# Patient Record
Sex: MALE | Race: WHITE
[De-identification: names, ages, dates, MRNs, and addresses within clinical notes are randomized per-mention and may not be internally consistent; named-entity substitution may affect disease eponyms.]

---

## 2017-05-07 ENCOUNTER — HOSPITAL ENCOUNTER (EMERGENCY)
Dept: HOSPITAL 31 - C.ER | Age: 52
LOS: 1 days | Discharge: HOME | End: 2017-05-08
Payer: SELF-PAY

## 2017-05-07 VITALS — BODY MASS INDEX: 20.9 KG/M2

## 2017-05-07 DIAGNOSIS — F10.10: ICD-10-CM
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
## 2017-05-07 DIAGNOSIS — S00.93XA: Primary | ICD-10-CM

## 2017-05-07 NOTE — C.PDOC
History Of Present Illness


51 year old patient presents to the ED complaining of falling on his face prior 

to arrival. Patient admits to drinking alcohol tonight. He reports he feels 

fine. Patient denies shortness of breath, nausea, vomiting or any other 

complaints. (Symone Balbuena)


History Per: Patient


History/Exam Limitations: intoxication


Onset/Duration Of Symptoms: Mins (prior to arrival)


Current Symptoms Are (Timing): Still Present


Severity: None


Pain Scale Rating Of: 0


Recent travel outside of the [place]: No


Time Seen by Provider: 05/07/17 21:13


Chief Complaint (Nursing): Abnormal Skin Integrity





Past Medical History


Reviewed: Historical Data, Nursing Documentation, Vital Signs





- Medical History


PMH: Seizures (AS PER EMS)


Family History: States: Unknown Family Hx





- Social History


Hx Alcohol Use: Yes


Hx Substance Use: No





- Immunization History


Hx Tetanus Toxoid Vaccination: No


Hx Influenza Vaccination: No


Hx Pneumococcal Vaccination: No





Review Of Systems


Except As Marked, All Systems Reviewed And Found Negative.


Respiratory: Negative for: Shortness of Breath


Gastrointestinal: Negative for: Nausea, Vomiting





Physical Exam





- Physical Exam


Appears: Non-toxic, No Acute Distress, Other (intoxicated)


Skin: Warm, Dry, Other (superficial abrasions to the distal aspect of the nose; 

mild erythema to the forehead)


Head: Atraumatic, Normacephalic


Eye(s): bilateral: Normal Inspection, EOMI


Ear(s): Bilateral: Normal


Nose: Normal


Oral Mucosa: Moist


Throat: Normal


Neck: Normal ROM, Supple


Chest: Symmetrical


Cardiovascular: Rhythm Regular


Respiratory: Normal Breath Sounds, No Rales, No Rhonchi, No Wheezing


Gastrointestinal/Abdominal: Soft, No Tenderness


Back: Normal Inspection, No CVA Tenderness


Extremity: Normal ROM


Neurological/Psych: Oriented x3, Normal Speech, Normal Cognition, Normal 

Cranial Nerves, Normal Motor, Normal Sensation


Gait: Steady





ED Course And Treatment


O2 Sat by Pulse Oximetry: 100 (Room air)


Pulse Ox Interpretation: Normal


Progress Note: Plan:  -Head CT.  -Reassess and disposition.  The wound was 

cleansed and dressed. CT was reviewed. Pending patient sobriety. Dr. Cuevas 

will reassess and disposition the patient.





Disposition





- Disposition


Disposition Time: 22:50





- Disposition


Referrals: 


[facility] [Outside]


Disposition: HOME/ ROUTINE


Condition: STABLE


Additional Instructions: 


Return to the ED for any new or worsening symptoms


Instructions:  Head Injury (ED)





- Clinical Impression


Clinical Impression: 


 ETOH abuse, Head contusion








- PA / NP / Resident Statement


MD/DO has reviewed & agrees with the documentation as recorded.





- Scribe Statement


The provider has reviewed the documentation as recorded by the Scribe





- Scribe Statement


Aziza Tyson





All medical record entries made by the Scribe were at my direction and 

personally dictated by me. I have reviewed the chart and agree that the record 

accurately reflects my personal performance of the history, physical exam, 

medical decision making, and the department course for this patient. I have 

also personally directed, reviewed, and agree with the discharge instructions 

and disposition. (Symone Balbuena)





Addendum


Addendum: 





Hx and Pe reviewed


Pt remained stable CT neg


On discharge pt is verbal with normal speech, steady gait, and no focal deficit 

(Jason Cuevas)

## 2017-05-08 VITALS
SYSTOLIC BLOOD PRESSURE: 131 MMHG | RESPIRATION RATE: 18 BRPM | TEMPERATURE: 98.3 F | DIASTOLIC BLOOD PRESSURE: 76 MMHG | OXYGEN SATURATION: 99 % | HEART RATE: 81 BPM

## 2017-05-08 NOTE — CT
PROCEDURE:  CT HEAD WITHOUT CONTRAST.



HISTORY:

Head trauma. 



COMPARISON:

None available. 



TECHNIQUE:

Axial computed tomography images were obtained through the head/brain 

without intravenous contrast.  



Radiation dose:



Total exam DLP = 958 mGy-cm.



This CT exam was performed using one or more of the following dose 

reduction techniques: Automated exposure control, adjustment of the 

mA and/or kV according to patient size, and/or use of iterative 

reconstruction technique.



FINDINGS:



HEMORRHAGE:

No intracranial hemorrhage. 



BRAIN:

Mild atrophy.  Scattered focal lucencies in the subcortical and 

periventricular white matter suggestive for chronic microvascular 

ischemic change. 



VENTRICLES:

Unremarkable. No hydrocephalus. 



CALVARIUM:

Question chronic deformity of the right zygomatic arch.



PARANASAL SINUSES:

Scattered mucosal thickening of the maxillary sinuses.



MASTOID AIR CELLS:

Unremarkable as visualized. No inflammatory changes.



OTHER FINDINGS:

Dental caries.  Intracranial vascular calcifications. 



IMPRESSION:

No acute intracranial hemorrhage.



Mild atrophy.



Mild mucosal thickening of the maxillary sinuses.



These findings were preliminarily reported at 10:11 p.m. on 

05/07/2017 by Dr. Rob Wilson from [facility].

## 2017-09-19 ENCOUNTER — HOSPITAL ENCOUNTER (OUTPATIENT)
Dept: HOSPITAL 14 - H.ER | Age: 52
Setting detail: OBSERVATION
Discharge: ANOTHER HOSPITAL | End: 2017-09-19
Attending: EMERGENCY MEDICINE | Admitting: EMERGENCY MEDICINE
Payer: MEDICAID

## 2017-09-19 VITALS — HEART RATE: 101 BPM | OXYGEN SATURATION: 93 %

## 2017-09-19 VITALS — TEMPERATURE: 98.5 F

## 2017-09-19 VITALS — DIASTOLIC BLOOD PRESSURE: 75 MMHG | SYSTOLIC BLOOD PRESSURE: 107 MMHG | RESPIRATION RATE: 19 BRPM

## 2017-09-19 DIAGNOSIS — S02.40DA: ICD-10-CM

## 2017-09-19 DIAGNOSIS — S02.2XXA: Primary | ICD-10-CM
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
## 2017-09-19 DIAGNOSIS — I48.91: ICD-10-CM
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
## 2017-09-19 DIAGNOSIS — F10.239: ICD-10-CM

## 2017-09-19 DIAGNOSIS — R56.9: ICD-10-CM
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
## 2017-09-19 LAB
ALBUMIN/GLOB SERPL: 1 {RATIO} (ref 1–2.1)
ALP SERPL-CCNC: 98 U/L (ref 38–126)
ALT SERPL-CCNC: 15 U/L (ref 21–72)
APTT BLD: 26.3 SECONDS (ref 25.6–37.1)
AST SERPL-CCNC: 33 U/L (ref 17–59)
BASOPHILS # BLD AUTO: 0.2 K/UL (ref 0–0.2)
BASOPHILS NFR BLD: 1 % (ref 0–2)
BASOPHILS NFR BLD: 2.3 % (ref 0–2)
BILIRUB SERPL-MCNC: 0.6 MG/DL (ref 0.2–1.3)
BILIRUB UR-MCNC: NEGATIVE MG/DL
BUN SERPL-MCNC: 9 MG/DL (ref 9–20)
CALCIUM SERPL-MCNC: 9.4 MG/DL (ref 8.4–10.2)
CHLORIDE SERPL-SCNC: 102 MMOL/L (ref 98–107)
CO2 SERPL-SCNC: 19 MMOL/L (ref 22–30)
COLOR UR: (no result)
EOSINOPHIL # BLD AUTO: 0 K/UL (ref 0–0.7)
EOSINOPHIL NFR BLD: 0.4 % (ref 0–4)
EOSINOPHIL NFR BLD: 2 % (ref 0–7)
ERYTHROCYTE [DISTWIDTH] IN BLOOD BY AUTOMATED COUNT: 18.1 % (ref 11.5–14.5)
ETHANOL SERPL-MCNC: < 10 MG/DL (ref 0–10)
GLOBULIN SER-MCNC: 4.1 GM/DL (ref 2.2–3.9)
GLUCOSE SERPL-MCNC: 130 MG/DL (ref 75–110)
GLUCOSE UR STRIP-MCNC: (no result) MG/DL
HCT VFR BLD CALC: 40 % (ref 35–51)
KETONES UR STRIP-MCNC: 20 MG/DL
LEUKOCYTE ESTERASE UR-ACNC: (no result) LEU/UL
LYMPHOCYTES # BLD AUTO: 0.9 K/UL (ref 1–4.3)
LYMPHOCYTES NFR BLD AUTO: 9.2 % (ref 20–40)
MCH RBC QN AUTO: 27.9 PG (ref 27–31)
MCHC RBC AUTO-ENTMCNC: 32.8 G/DL (ref 33–37)
MCV RBC AUTO: 85.2 FL (ref 80–94)
MONOCYTES # BLD: 0.6 K/UL (ref 0–0.8)
MONOCYTES NFR BLD: 6.1 % (ref 0–10)
NEUTROPHILS # BLD: 8.2 K/UL (ref 1.8–7)
NEUTROPHILS NFR BLD AUTO: 82 % (ref 50–75)
NEUTROPHILS NFR BLD AUTO: 83 % (ref 42–75)
NRBC BLD AUTO-RTO: 0 % (ref 0–0)
PH UR STRIP: 6 [PH] (ref 5–8)
PLATELET # BLD: 329 K/UL (ref 130–400)
PMV BLD AUTO: 7.6 FL (ref 7.2–11.7)
POTASSIUM SERPL-SCNC: 3.9 MMOL/L (ref 3.6–5)
PROT SERPL-MCNC: 8.3 G/DL (ref 6.3–8.2)
PROT UR STRIP-MCNC: NEGATIVE MG/DL
RBC # UR STRIP: (no result) /UL
RBC #/AREA URNS HPF: 4 /HPF (ref 0–3)
SODIUM SERPL-SCNC: 138 MMOL/L (ref 132–148)
SP GR UR STRIP: 1.01 (ref 1–1.03)
TOTAL CELLS COUNTED BLD: 100
UROBILINOGEN UR-MCNC: (no result) MG/DL (ref 0.2–1)
WBC # BLD AUTO: 10 K/UL (ref 4.8–10.8)
WBC #/AREA URNS HPF: < 1 /HPF (ref 0–5)

## 2017-09-19 PROCEDURE — 83735 ASSAY OF MAGNESIUM: CPT

## 2017-09-19 PROCEDURE — 90471 IMMUNIZATION ADMIN: CPT

## 2017-09-19 PROCEDURE — 70486 CT MAXILLOFACIAL W/O DYE: CPT

## 2017-09-19 PROCEDURE — 71010: CPT

## 2017-09-19 PROCEDURE — 80053 COMPREHEN METABOLIC PANEL: CPT

## 2017-09-19 PROCEDURE — 82948 REAGENT STRIP/BLOOD GLUCOSE: CPT

## 2017-09-19 PROCEDURE — 96361 HYDRATE IV INFUSION ADD-ON: CPT

## 2017-09-19 PROCEDURE — 85025 COMPLETE CBC W/AUTO DIFF WBC: CPT

## 2017-09-19 PROCEDURE — 96367 TX/PROPH/DG ADDL SEQ IV INF: CPT

## 2017-09-19 PROCEDURE — 70450 CT HEAD/BRAIN W/O DYE: CPT

## 2017-09-19 PROCEDURE — 72125 CT NECK SPINE W/O DYE: CPT

## 2017-09-19 PROCEDURE — 84484 ASSAY OF TROPONIN QUANT: CPT

## 2017-09-19 PROCEDURE — 96365 THER/PROPH/DIAG IV INF INIT: CPT

## 2017-09-19 PROCEDURE — 99285 EMERGENCY DEPT VISIT HI MDM: CPT

## 2017-09-19 PROCEDURE — 93005 ELECTROCARDIOGRAM TRACING: CPT

## 2017-09-19 PROCEDURE — 83605 ASSAY OF LACTIC ACID: CPT

## 2017-09-19 PROCEDURE — 96375 TX/PRO/DX INJ NEW DRUG ADDON: CPT

## 2017-09-19 PROCEDURE — 85610 PROTHROMBIN TIME: CPT

## 2017-09-19 PROCEDURE — 81003 URINALYSIS AUTO W/O SCOPE: CPT

## 2017-09-19 PROCEDURE — 85730 THROMBOPLASTIN TIME PARTIAL: CPT

## 2017-09-19 PROCEDURE — 80299 QUANTITATIVE ASSAY DRUG: CPT

## 2017-09-19 PROCEDURE — 90715 TDAP VACCINE 7 YRS/> IM: CPT

## 2017-09-19 NOTE — CT
PROCEDURE:  CT HEAD WITHOUT CONTRAST.



HISTORY:

Syncope



COMPARISON:

September 19, 2017. Cervical spine and maxillofacial CT reported 

separately 



TECHNIQUE:

Axial computed tomography images were obtained through the head/brain 

without intravenous contrast.  



Radiation dose:



Total exam DLP = 879.07 mGy-cm.



This CT exam was performed using one or more of the following dose 

reduction techniques: Automated exposure control, adjustment of the 

mA and/or kV according to patient size, and/or use of iterative 

reconstruction technique.



FINDINGS:



HEMORRHAGE:

No intracranial hemorrhage. 



BRAIN:

No mass effect or edema.  No atrophy or chronic microvascular 

ischemic changes.



VENTRICLES:

Unremarkable. No hydrocephalus. 



CALVARIUM:

No calvarial fractures.  There are facial fractures which are 

incompletely visualized including nasal bone fractures, fractures of 

the inferior orbital wall/ maxilla on the left with blood fluid level 

on the left and blood fluid level right maxillary sinus.



PARANASAL SINUSES:

Unremarkable as visualized. No significant inflammatory changes.



MASTOID AIR CELLS:

Unremarkable as visualized. No inflammatory changes.



OTHER FINDINGS:

None.



IMPRESSION:

No acute intracranial abnormalities.  Incompletely visualized facial 

fractures which will be described in greater detail maxillofacial CT.

## 2017-09-19 NOTE — ED PDOC
HPI: Trauma/Fall





- HPI


Time Seen by Provider: 09/19/17 08:03


Chief Complaint (Nursing): Trauma


Chief Complaint (Provider): trauma 


History Per: EMS


Additional Complaint(s): 





Titi Riley is a 51 year old non-domiciled male, with no previous 

medical history, who presents to the ED via EMS for evaluation after being 

found in the parking lot of an [employer] unresponsive with a bleeding lip. Patient 

is unsure what happened and reports the last thing he remembered was going to 

sleep in the parking lot. He reports pain to his teeth and dizziness but denies 

any headaches or neck pain.  





Past Medical History


Reviewed: Historical Data, Nursing Documentation, Vital Signs


Vital Signs: 





 Last Vital Signs











Temp  98.5 F   09/19/17 07:39


 


Pulse  171 H  09/19/17 08:02


 


Resp  24   09/19/17 08:02


 


BP  112/91 H  09/19/17 07:39


 


Pulse Ox  93 L  09/19/17 08:02














- Medical History


PMH: No Chronic Diseases





- Home Medications


Home Medications: 


 Ambulatory Orders











 Medication  Instructions  Recorded


 


No Known Home Med  09/19/17














- Allergies


Allergies/Adverse Reactions: 


 Allergies











Allergy/AdvReac Type Severity Reaction Status Date / Time


 


No Known Allergies Allergy   Verified 09/19/17 08:16














Physical Exam





- Reviewed


Nursing Documentation Reviewed: Yes


Vital Signs Reviewed: Yes





- Physical Exam


Appears: Positive for: Well, Non-toxic, No Acute Distress


Head Exam: Positive for: ATRAUMATIC, NORMAL INSPECTION, NORMOCEPHALIC


Skin: Positive for: Normal Color, Warm, Dry


Eye Exam: Positive for: EOMI, Normal appearance, PERRL


ENT: Positive for: Other (avulsion of teeth 9,10 and 11 with blood but no 

active bleeding )


Neck: Positive for: Normal, Painless ROM


Cardiovascular/Chest: Positive for: Regular Rate, Rhythm


Respiratory: Positive for: CNT, Normal Breath Sounds


Gastrointestinal/Abdominal: Positive for: Normal Exam, Bowel Sounds, Soft


Back: Positive for: Normal Inspection


Extremity: Positive for: Normal ROM


Neurologic/Psych: Positive for: Alert, Oriented





- Laboratory Results


Result Diagrams: 


 09/19/17 08:38





 09/19/17 08:38





- ECG


O2 Sat by Pulse Oximetry: 93 (RA pt placed on 2LO2)


Pulse Ox Interpretation: Abnormal





- Critical Care


Total Time (In Min): 90





Medical Decision Making


Medical Decision Making: 





Initial Impression: trauma 





Initial Plan:


* EKG


* urinalysis


* accu-check 


* tetanus 0.5 ml im


* cardizem 20 mg IV


* IV NS 1,000 ml at 1,000 ml/hr 


* PTT


* PT


* labs 


* urine dip


* urine drug screen 


* troponin I 


* ct head w/o contrast


* ct maxillofacial w/o contrast


* ct cervical spine w/o contrast 


* reevaluation 





09:45


CT head 


FINDINGS:





HEMORRHAGE:


No intracranial hemorrhage. 





BRAIN:


No mass effect or edema.  No atrophy or chronic microvascular ischemic changes.





VENTRICLES:


Unremarkable. No hydrocephalus. 





CALVARIUM:


No calvarial fractures.  There are facial fractures which are incompletely 

visualized including nasal bone fractures, fractures of the inferior orbital 

wall/ maxilla on the left with blood fluid level on the left and blood fluid 

level right maxillary sinus.





PARANASAL SINUSES:


Unremarkable as visualized. No significant inflammatory changes.





MASTOID AIR CELLS:


Unremarkable as visualized. No inflammatory changes.





OTHER FINDINGS:


None.





IMPRESSION:


No acute intracranial abnormalities.  Incompletely visualized facial fractures 

which will be described in greater detail maxillofacial CT.








10:29


CT maxillofacial 


FINDINGS:





NASAL BONES:


Bilateral nondisplaced nasal bone fractures. 





Adjacent fracture through the hard palate 





ORBITS:


Periorbital soft tissue swelling left greater than right.





PARANASAL SINUSES/ MASTOIDS:


Clear.





MAXILLA:


Nondisplaced fracture involving the left maxilla marked on the study for 

review. 





Fractures through the medial wall of the left maxillary sinus. Small 

nondisplaced fracture anterior wall of the maxillary sinus on the left. 

Irregular fracture through the posterior lateral wall of the left maxillary 

sinus. 





Blood fluid level in the left maxillary sinus. 





Air-fluid level through the right maxillary sinus. 





Ethmoid air cell disease likely posttraumatic. 





MANDIBLE/ TEMPOROMANDIBULAR JOINTS:


Unremarkable.





SKULL BASE:


Unremarkable.





TEMPORAL BONES:


Middle ears and mastoid grossly unremarkable.





OTHER FINDINGS:


Fracture nondisplaced through the left The finding is marked on the study for 

review.  lateral pterygoid.





IMPRESSION:


1. Complex fractures involving anterior, medial and posterior wall of the left 

maxillary sinus with blood fluid level.





2. Fractures of the nasal bone on the left, nondisplaced.  There may be a small 

fracture the right is bone.





3. Lateral pterygoid plate fracture on the left.





4. Fracture through the maxilla on the left tracking along a ports the in size 

are. The finding is marked on the study for review.  





Additional benign and/or incidental findings described above.





10:56


Patient experienced a witnessed seizure and given ativan. 





13:25


Spoke with Dr. Levine who said fracture are not operable and suggested ER to ER 

transfer. 





13:28


Spoke to Dr. Julian from [facility] who accepted the transfer form ER to ER. 





--------------------------------------------------------------------------------

---------


Scribe Attestation:


Documented by Soila Ayon, acting as a scribe for Soila Hoyos MD.





Provider Scribe Attestation:


All medical record entries made by the Scribe were at my direction and 

personally dictated by me. I have reviewed the chart and agree that the record 

accurately reflects my personal performance of the history, physical exam, 

medical decision making, and the department course for this patient. I have 

also personally directed, reviewed, and agree with the discharge instructions 

and disposition





Disposition





- Clinical Impression


Clinical Impression: 


 Alcohol withdrawal seizure, New onset atrial fibrillation, Atrial fibrillation 

with RVR, Maxillary sinus fracture, Nasal bone fracture, Maxillary fracture








- Patient ED Disposition


Is Patient to be Admitted: Transfer of Care





- Disposition


Disposition Time: 13:47


Condition: GUARDED





ED OBSERVATION


Date of observation admission: 09/19/17


Time of observation admission: 09:00





- Observation admission statement


Patient is being placed in observation because:: 





Acuity of care needed and extensive ED workup 





- Goals of Observation


Goals of observation are:: 





results of extensive ED workup

## 2017-09-19 NOTE — CT
PROCEDURE:  CT Cervical Spine without contrast



HISTORY:

<Syncope>



COMPARISON:

None available.



TECHNIQUE:

Axial computed tomography images were obtained of the cervical spine 

without the use of intravenous contrast. Coronal and sagittal 

reformatted images were created and reviewed.



Radiation dose: 



Total exam DLP = go are mGy-cm.



This CT exam was performed using one or more of the following dose 

reduction techniques: Automated exposure control, adjustment of the 

mA and/or kV according to patient size, and/or use of iterative 

reconstruction technique.



FINDINGS:



VERTEBRAE:

No fracture. Normal alignment. No destructive bony lesion.



DISCS/SPINAL CANAL/NEURAL FORAMINA:

No significant central canal or neural foraminal stenosis. Discs 

heights are grossly preserved.



PARASPINAL SOFT TISSUES:

Unremarkable. 



OTHER FINDINGS:

Incompletely visualize facial fractures and associated findings in 

both maxillary sinuses left greater than right.



IMPRESSION:

Normal cervical vertebral body heights, alignment. No pre or 

paravertebral abnormalities identified.

## 2017-09-19 NOTE — CT
PROCEDURE:  CT MAXILLOFACIAL BONES WITHOUT CONTRAST



HISTORY:

Syncope



COMPARISON:

None



TECHNIQUE:

Contiguous axial CT  images of the maxillofacial bones were obtained. 

Coronal and sagittal reformats were generated.



Radiation dose:



Total exam DLP = 724.95 mGy-cm.



This CT exam was performed using one or more of the following dose 

reduction techniques: Automated exposure control, adjustment of the 

mA and/or kV according to patient size, and/or use of iterative 

reconstruction technique.



FINDINGS:



NASAL BONES:

Bilateral nondisplaced nasal bone fractures. 



Adjacent fracture through the hard palate 



ORBITS:

Periorbital soft tissue swelling left greater than right.



PARANASAL SINUSES/ MASTOIDS:

Clear.



MAXILLA:

Nondisplaced fracture involving the left maxilla marked on the study 

for review. 



Fractures through the medial wall of the left maxillary sinus. Small 

nondisplaced fracture anterior wall of the maxillary sinus on the 

left. Irregular fracture through the posterior lateral wall of the 

left maxillary sinus. 



Blood fluid level in the left maxillary sinus. 



Air-fluid level through the right maxillary sinus. 



Ethmoid air cell disease likely posttraumatic. 



MANDIBLE/ TEMPOROMANDIBULAR JOINTS:

Unremarkable.



SKULL BASE:

Unremarkable.



TEMPORAL BONES:

Middle ears and mastoid grossly unremarkable.



OTHER FINDINGS:

Fracture nondisplaced through the left The finding is marked on the 

study for review.  lateral pterygoid.



IMPRESSION:

1. Complex fractures involving anterior, medial and posterior wall of 

the left maxillary sinus with blood fluid level.



2. Fractures of the nasal bone on the left, nondisplaced.  There may 

be a small fracture the right is bone.



3. Lateral pterygoid plate fracture on the left.



4. Fracture through the maxilla on the left tracking along a ports 

the in size are. The finding is marked on the study for review.  



Additional benign and/or incidental findings described above.

## 2017-09-19 NOTE — CP.CCUPN
CCU Subjective





- Physician Review


Events Since Last Encounter (Free Text): 





09/19/17 18:35


The patient was Seen/interviewed and examined by me at the bedside in the ER, 


Medical records reviewed and Management issues were discussed and formulated 

with the house staff.


Mr Mims is a 51 Years old Male  with no Past medical history, 


who presents to the ED via EMS for evaluation after being found in the parking 

lot of an [facility] unresponsive with a bleeding lip. 


Emergency department he is intoxicated, received Keppre for seizures, 


Found to be in A-Fib with RVR, started on Cardiazem drip


Underwent CT maxillofacial showing  


IMPRESSION:


1. Complex fractures involving anterior, medial and posterior wall of the left 

maxillary sinus with blood fluid level.


2. Fractures of the nasal bone on the left, nondisplaced.  There may be a small 

fracture the right is bone.


3. Lateral pterygoid plate fracture on the left.


4. Fracture through the maxilla on the left tracking along a ports the in size 

are. The finding is marked on the study for review.





After medical management including aggressive hydrations, Magnesium supplement, 

Keppra and he is hemodynamically better, 


ER physician spoke [place]. [place] [facility] [place] form ER to 

ER. 





CCU Objective





- Vital Signs / Intake & Output


Vital Signs (Last 4 hours): 


Vital Signs











  Pulse Resp BP Pulse Ox


 


 09/19/17 13:47     93 L


 


 09/19/17 13:30  101 H  19  107/75  100


 


 09/19/17 13:00  101 H  20  104/72  100











Intake and Output (Last 8hrs): 


 Intake & Output











 09/19/17 09/19/17 09/19/17





 06:59 14:59 22:59


 


Weight  140 lb 














- Physical Exam


Physical Exam Limitations: Positive for: Altered Mental Status, Clinical 

Condition, Intoxication


Head: Positive for: Tenderness, Contusion, Swelling


Pupils: Positive for: PERRL.  Negative for: Sluggish, Non-Reactive


Extroacular Muscles: Positive for: EOMI


Conjunctiva: Positive for: Normal, Injected.  Negative for: Icteric


Ears: Positive for: Normal


Mouth: Positive for: Moist Mucous Membranes, Other (avulsion of teeth 9,10 and 

11 with blood but no active bleeding)


Neck: Positive for: Normal Range of Motion, Trachea Midline


Respiratory/Chest: Positive for: Clear to Auscultation, Good Air Exchange.  

Negative for: Respiratory Distress, Accessory Muscle Use


Cardiovascular: Positive for: Normal S1, S2, Peripheal Pulses Present, 

Tachycardic


Abdomen: Positive for: Normal Bowel Sounds.  Negative for: Tenderness, 

Distention, Peritoneal Signs





- Medications


Active Medications: 


Active Medications











Generic Name Dose Route Start Last Admin





  Trade Name Freq  PRN Reason Stop Dose Admin


 


Diltiazem HCl 125 mg/ Sodium  125 mls @ 5 mls/hr  09/19/17 08:55  09/19/17 08:55





  Chloride  IV  09/20/17 08:54  5 mls/hr





  .Q24H ONE   Administration





  Protocol   





  5 MG/HR   


 


Diltiazem HCl 125 mg/ Sodium  125 mls @ 10 mls/hr  09/19/17 08:55  09/19/17 09:

05





  Chloride  IV  09/19/17 21:24  10 mls/hr





  .[id number] ONE   Administration





  Protocol   





  10 MG/HR   


 


Levetiracetam 1,000 mg/ Sodium  110 mls @ 210 mls/hr  09/19/17 11:00  09/19/17 

11:17





  Chloride  IVPB   210 mls/hr





  Q12 MANA   Administration


 


Multivitamins/Vitamin C 10 ml/  1,011.2 mls @ 100 mls/hr  09/19/17 12:11  09/19/ 17 12:59





Thiamine HCl 100 mg/ Folic  IV  09/19/17 22:17  100 mls/hr





Acid 1 mg/ Sodium Chloride  .Q10H7M ONE   Administration


 


Diltiazem HCl 125 mg/ Sodium  125 mls @ 10 mls/hr  09/19/17 14:09  09/19/17 14:

18





  Chloride  IV  09/20/17 02:38  10 mls/hr





  .[id number] ONE   Administration





  Protocol   





  10 MG/HR   














- Patient Studies


Lab Studies: 


 Lab Studies











  09/19/17 09/19/17 09/19/17 Range/Units





  12:40 11:01 10:09 


 


WBC     (4.8-10.8)  K/uL


 


RBC     (4.40-5.90)  Mil/uL


 


Hgb     (12.0-18.0)  g/dL


 


Hct     (35.0-51.0)  %


 


MCV     (80.0-94.0)  fl


 


MCH     (27.0-31.0)  pg


 


MCHC     (33.0-37.0)  g/dL


 


RDW     (11.5-14.5)  %


 


Plt Count     (130-400)  K/uL


 


MPV     (7.2-11.7)  fl


 


Neut % (Auto)     (50.0-75.0)  %


 


Lymph % (Auto)     (20.0-40.0)  %


 


Mono % (Auto)     (0.0-10.0)  %


 


Eos % (Auto)     (0.0-4.0)  %


 


Baso % (Auto)     (0.0-2.0)  %


 


Neut #     (1.8-7.0)  K/uL


 


Lymph #     (1.0-4.3)  K/uL


 


Mono #     (0.0-0.8)  K/uL


 


Eos #     (0.0-0.7)  K/uL


 


Baso #     (0.0-0.2)  K/uL


 


Neutrophils % (Manual)     (42-75)  %


 


Lymphocytes % (Manual)     (20-50)  %


 


Monocytes % (Manual)     (0-10)  %


 


Eosinophils % (Manual)     (0-7)  %


 


Basophils % (Manual)     (0-2)  %


 


Platelet Estimate     (NORMAL)  


 


Anisocytosis (manual)     


 


PT     (9.8-13.1)  Seconds


 


INR     (0.9-1.2)  


 


APTT     (25.6-37.1)  Seconds


 


Sodium     (132-148)  mmol/l


 


Potassium     (3.6-5.0)  MMOL/L


 


Chloride     ()  mmol/L


 


Carbon Dioxide     (22-30)  mmol/L


 


Anion Gap     (10-20)  


 


BUN     (9-20)  mg/dl


 


Creatinine     (0.8-1.5)  mg/dL


 


Est GFR (African Amer)     


 


Est GFR (Non-Af Amer)     


 


POC Glucose (mg/dL)     ()  mg/dL


 


Random Glucose     ()  mg/dL


 


Lactic Acid   11.4 H*   (0.7-2.1)  MMOL/L


 


Calcium     (8.4-10.2)  mg/dL


 


Magnesium  2.2    (1.6-2.3)  MG/DL


 


Total Bilirubin     (0.2-1.3)  mg/dl


 


AST     (17-59)  U/L


 


ALT     (21-72)  U/L


 


Alkaline Phosphatase     ()  U/L


 


Troponin I     (0.00-0.120)  ng/mL


 


Total Protein     (6.3-8.2)  G/DL


 


Albumin     (3.5-5.0)  g/dL


 


Globulin     (2.2-3.9)  gm/dL


 


Albumin/Globulin Ratio     (1.0-2.1)  


 


Urine Color     (YELLOW)  


 


Urine Clarity     (Clear)  


 


Urine pH     (5.0-8.0)  


 


Ur Specific Gravity     (1.003-1.030)  


 


Urine Protein     (NEGATIVE)  mg/dL


 


Urine Glucose (UA)     (Normal)  mg/dL


 


Urine Ketones     (NEGATIVE)  mg/dL


 


Urine Blood     (NEGATIVE)  


 


Urine Nitrate     (NEGATIVE)  


 


Urine Bilirubin     (NEGATIVE)  


 


Urine Urobilinogen     (0.2-1.0)  mg/dL


 


Ur Leukocyte Esterase     (Negative)  Nancy/uL


 


Urine RBC (Auto)     (0-3)  /hpf


 


Urine Microscopic WBC     (0-5)  /hpf


 


Urine Opiates Screen    Negative  (NEGATIVE)  


 


Urine Methadone Screen    Negative  (NEGATIVE)  


 


Ur Barbiturates Screen    Negative  (NEGATIVE)  


 


Ur Phencyclidine Scrn    Negative  (NEGATIVE)  


 


Ur Amphetamines Screen    Negative  (NEGATIVE)  


 


U Benzodiazepines Scrn    Negative  (NEGATIVE)  


 


U Oth Cocaine Metabols    Negative  (NEGATIVE)  


 


U Cannabinoids Screen    Negative  (NEGATIVE)  


 


Alcohol, Quantitative     (0-10)  mg/dl














  09/19/17 09/19/17 09/19/17 Range/Units





  10:09 08:38 08:38 


 


WBC     (4.8-10.8)  K/uL


 


RBC     (4.40-5.90)  Mil/uL


 


Hgb     (12.0-18.0)  g/dL


 


Hct     (35.0-51.0)  %


 


MCV     (80.0-94.0)  fl


 


MCH     (27.0-31.0)  pg


 


MCHC     (33.0-37.0)  g/dL


 


RDW     (11.5-14.5)  %


 


Plt Count     (130-400)  K/uL


 


MPV     (7.2-11.7)  fl


 


Neut % (Auto)     (50.0-75.0)  %


 


Lymph % (Auto)     (20.0-40.0)  %


 


Mono % (Auto)     (0.0-10.0)  %


 


Eos % (Auto)     (0.0-4.0)  %


 


Baso % (Auto)     (0.0-2.0)  %


 


Neut #     (1.8-7.0)  K/uL


 


Lymph #     (1.0-4.3)  K/uL


 


Mono #     (0.0-0.8)  K/uL


 


Eos #     (0.0-0.7)  K/uL


 


Baso #     (0.0-0.2)  K/uL


 


Neutrophils % (Manual)     (42-75)  %


 


Lymphocytes % (Manual)     (20-50)  %


 


Monocytes % (Manual)     (0-10)  %


 


Eosinophils % (Manual)     (0-7)  %


 


Basophils % (Manual)     (0-2)  %


 


Platelet Estimate     (NORMAL)  


 


Anisocytosis (manual)     


 


PT   10.7   (9.8-13.1)  Seconds


 


INR   1.0   (0.9-1.2)  


 


APTT   26.3   (25.6-37.1)  Seconds


 


Sodium    138  (132-148)  mmol/l


 


Potassium    3.9  (3.6-5.0)  MMOL/L


 


Chloride    102  ()  mmol/L


 


Carbon Dioxide    19 L  (22-30)  mmol/L


 


Anion Gap    21 H  (10-20)  


 


BUN    9  (9-20)  mg/dl


 


Creatinine    0.6 L  (0.8-1.5)  mg/dL


 


Est GFR (African Amer)    > 60  


 


Est GFR (Non-Af Amer)    > 60  


 


POC Glucose (mg/dL)     ()  mg/dL


 


Random Glucose    130 H  ()  mg/dL


 


Lactic Acid     (0.7-2.1)  MMOL/L


 


Calcium    9.4  (8.4-10.2)  mg/dL


 


Magnesium     (1.6-2.3)  MG/DL


 


Total Bilirubin    0.6  (0.2-1.3)  mg/dl


 


AST    33  (17-59)  U/L


 


ALT    15 L  (21-72)  U/L


 


Alkaline Phosphatase    98  ()  U/L


 


Troponin I    < 0.0120  (0.00-0.120)  ng/mL


 


Total Protein    8.3 H  (6.3-8.2)  G/DL


 


Albumin    4.2  (3.5-5.0)  g/dL


 


Globulin    4.1 H  (2.2-3.9)  gm/dL


 


Albumin/Globulin Ratio    1.0  (1.0-2.1)  


 


Urine Color  Straw    (YELLOW)  


 


Urine Clarity  Clear    (Clear)  


 


Urine pH  6.0    (5.0-8.0)  


 


Ur Specific Gravity  1.011    (1.003-1.030)  


 


Urine Protein  Negative    (NEGATIVE)  mg/dL


 


Urine Glucose (UA)  Neg    (Normal)  mg/dL


 


Urine Ketones  20    (NEGATIVE)  mg/dL


 


Urine Blood  Small    (NEGATIVE)  


 


Urine Nitrate  Negative    (NEGATIVE)  


 


Urine Bilirubin  Negative    (NEGATIVE)  


 


Urine Urobilinogen  0.2-1.0    (0.2-1.0)  mg/dL


 


Ur Leukocyte Esterase  Neg    (Negative)  Nancy/uL


 


Urine RBC (Auto)  4 H    (0-3)  /hpf


 


Urine Microscopic WBC  < 1    (0-5)  /hpf


 


Urine Opiates Screen     (NEGATIVE)  


 


Urine Methadone Screen     (NEGATIVE)  


 


Ur Barbiturates Screen     (NEGATIVE)  


 


Ur Phencyclidine Scrn     (NEGATIVE)  


 


Ur Amphetamines Screen     (NEGATIVE)  


 


U Benzodiazepines Scrn     (NEGATIVE)  


 


U Oth Cocaine Metabols     (NEGATIVE)  


 


U Cannabinoids Screen     (NEGATIVE)  


 


Alcohol, Quantitative    < 10  (0-10)  mg/dl














  09/19/17 09/19/17 Range/Units





  08:38 07:58 


 


WBC  10.0   (4.8-10.8)  K/uL


 


RBC  4.69   (4.40-5.90)  Mil/uL


 


Hgb  13.1   (12.0-18.0)  g/dL


 


Hct  40.0   (35.0-51.0)  %


 


MCV  85.2   (80.0-94.0)  fl


 


MCH  27.9   (27.0-31.0)  pg


 


MCHC  32.8 L   (33.0-37.0)  g/dL


 


RDW  18.1 H   (11.5-14.5)  %


 


Plt Count  329   (130-400)  K/uL


 


MPV  7.6   (7.2-11.7)  fl


 


Neut % (Auto)  82.0 H   (50.0-75.0)  %


 


Lymph % (Auto)  9.2 L   (20.0-40.0)  %


 


Mono % (Auto)  6.1   (0.0-10.0)  %


 


Eos % (Auto)  0.4   (0.0-4.0)  %


 


Baso % (Auto)  2.3 H   (0.0-2.0)  %


 


Neut #  8.2 H   (1.8-7.0)  K/uL


 


Lymph #  0.9 L   (1.0-4.3)  K/uL


 


Mono #  0.6   (0.0-0.8)  K/uL


 


Eos #  0.0   (0.0-0.7)  K/uL


 


Baso #  0.2   (0.0-0.2)  K/uL


 


Neutrophils % (Manual)  83 H   (42-75)  %


 


Lymphocytes % (Manual)  9 L   (20-50)  %


 


Monocytes % (Manual)  5   (0-10)  %


 


Eosinophils % (Manual)  2   (0-7)  %


 


Basophils % (Manual)  1   (0-2)  %


 


Platelet Estimate  Normal   (NORMAL)  


 


Anisocytosis (manual)  Slight   


 


PT    (9.8-13.1)  Seconds


 


INR    (0.9-1.2)  


 


APTT    (25.6-37.1)  Seconds


 


Sodium    (132-148)  mmol/l


 


Potassium    (3.6-5.0)  MMOL/L


 


Chloride    ()  mmol/L


 


Carbon Dioxide    (22-30)  mmol/L


 


Anion Gap    (10-20)  


 


BUN    (9-20)  mg/dl


 


Creatinine    (0.8-1.5)  mg/dL


 


Est GFR (African Amer)    


 


Est GFR (Non-Af Amer)    


 


POC Glucose (mg/dL)   130 H  ()  mg/dL


 


Random Glucose    ()  mg/dL


 


Lactic Acid    (0.7-2.1)  MMOL/L


 


Calcium    (8.4-10.2)  mg/dL


 


Magnesium    (1.6-2.3)  MG/DL


 


Total Bilirubin    (0.2-1.3)  mg/dl


 


AST    (17-59)  U/L


 


ALT    (21-72)  U/L


 


Alkaline Phosphatase    ()  U/L


 


Troponin I    (0.00-0.120)  ng/mL


 


Total Protein    (6.3-8.2)  G/DL


 


Albumin    (3.5-5.0)  g/dL


 


Globulin    (2.2-3.9)  gm/dL


 


Albumin/Globulin Ratio    (1.0-2.1)  


 


Urine Color    (YELLOW)  


 


Urine Clarity    (Clear)  


 


Urine pH    (5.0-8.0)  


 


Ur Specific Gravity    (1.003-1.030)  


 


Urine Protein    (NEGATIVE)  mg/dL


 


Urine Glucose (UA)    (Normal)  mg/dL


 


Urine Ketones    (NEGATIVE)  mg/dL


 


Urine Blood    (NEGATIVE)  


 


Urine Nitrate    (NEGATIVE)  


 


Urine Bilirubin    (NEGATIVE)  


 


Urine Urobilinogen    (0.2-1.0)  mg/dL


 


Ur Leukocyte Esterase    (Negative)  Nancy/uL


 


Urine RBC (Auto)    (0-3)  /hpf


 


Urine Microscopic WBC    (0-5)  /hpf


 


Urine Opiates Screen    (NEGATIVE)  


 


Urine Methadone Screen    (NEGATIVE)  


 


Ur Barbiturates Screen    (NEGATIVE)  


 


Ur Phencyclidine Scrn    (NEGATIVE)  


 


Ur Amphetamines Screen    (NEGATIVE)  


 


U Benzodiazepines Scrn    (NEGATIVE)  


 


U Oth Cocaine Metabols    (NEGATIVE)  


 


U Cannabinoids Screen    (NEGATIVE)  


 


Alcohol, Quantitative    (0-10)  mg/dl








 Laboratory Results - last 24 hr











  09/19/17 09/19/17 09/19/17





  07:58 08:38 08:38


 


WBC   10.0 


 


RBC   4.69 


 


Hgb   13.1 


 


Hct   40.0 


 


MCV   85.2 


 


MCH   27.9 


 


MCHC   32.8 L 


 


RDW   18.1 H 


 


Plt Count   329 


 


MPV   7.6 


 


Neut % (Auto)   82.0 H 


 


Lymph % (Auto)   9.2 L 


 


Mono % (Auto)   6.1 


 


Eos % (Auto)   0.4 


 


Baso % (Auto)   2.3 H 


 


Neut #   8.2 H 


 


Lymph #   0.9 L 


 


Mono #   0.6 


 


Eos #   0.0 


 


Baso #   0.2 


 


Neutrophils % (Manual)   83 H 


 


Lymphocytes % (Manual)   9 L 


 


Monocytes % (Manual)   5 


 


Eosinophils % (Manual)   2 


 


Basophils % (Manual)   1 


 


Platelet Estimate   Normal 


 


Anisocytosis (manual)   Slight 


 


PT   


 


INR   


 


APTT   


 


Sodium    138


 


Potassium    3.9


 


Chloride    102


 


Carbon Dioxide    19 L


 


Anion Gap    21 H


 


BUN    9


 


Creatinine    0.6 L


 


Est GFR ( Amer)    > 60


 


Est GFR (Non-Af Amer)    > 60


 


POC Glucose (mg/dL)  130 H  


 


Random Glucose    130 H


 


Lactic Acid   


 


Calcium    9.4


 


Magnesium   


 


Total Bilirubin    0.6


 


AST    33


 


ALT    15 L


 


Alkaline Phosphatase    98


 


Troponin I    < 0.0120


 


Total Protein    8.3 H


 


Albumin    4.2


 


Globulin    4.1 H


 


Albumin/Globulin Ratio    1.0


 


Urine Color   


 


Urine Clarity   


 


Urine pH   


 


Ur Specific Gravity   


 


Urine Protein   


 


Urine Glucose (UA)   


 


Urine Ketones   


 


Urine Blood   


 


Urine Nitrate   


 


Urine Bilirubin   


 


Urine Urobilinogen   


 


Ur Leukocyte Esterase   


 


Urine RBC (Auto)   


 


Urine Microscopic WBC   


 


Urine Opiates Screen   


 


Urine Methadone Screen   


 


Ur Barbiturates Screen   


 


Ur Phencyclidine Scrn   


 


Ur Amphetamines Screen   


 


U Benzodiazepines Scrn   


 


U Oth Cocaine Metabols   


 


U Cannabinoids Screen   


 


Alcohol, Quantitative    < 10














  09/19/17 09/19/17 09/19/17





  08:38 10:09 10:09


 


WBC   


 


RBC   


 


Hgb   


 


Hct   


 


MCV   


 


MCH   


 


MCHC   


 


RDW   


 


Plt Count   


 


MPV   


 


Neut % (Auto)   


 


Lymph % (Auto)   


 


Mono % (Auto)   


 


Eos % (Auto)   


 


Baso % (Auto)   


 


Neut #   


 


Lymph #   


 


Mono #   


 


Eos #   


 


Baso #   


 


Neutrophils % (Manual)   


 


Lymphocytes % (Manual)   


 


Monocytes % (Manual)   


 


Eosinophils % (Manual)   


 


Basophils % (Manual)   


 


Platelet Estimate   


 


Anisocytosis (manual)   


 


PT  10.7  


 


INR  1.0  


 


APTT  26.3  


 


Sodium   


 


Potassium   


 


Chloride   


 


Carbon Dioxide   


 


Anion Gap   


 


BUN   


 


Creatinine   


 


Est GFR ( Amer)   


 


Est GFR (Non-Af Amer)   


 


POC Glucose (mg/dL)   


 


Random Glucose   


 


Lactic Acid   


 


Calcium   


 


Magnesium   


 


Total Bilirubin   


 


AST   


 


ALT   


 


Alkaline Phosphatase   


 


Troponin I   


 


Total Protein   


 


Albumin   


 


Globulin   


 


Albumin/Globulin Ratio   


 


Urine Color   Straw 


 


Urine Clarity   Clear 


 


Urine pH   6.0 


 


Ur Specific [place]   1.011 


 


Urine Protein   Negative 


 


Urine Glucose (UA)   Neg 


 


Urine Ketones   20 


 


Urine Blood   Small 


 


Urine Nitrate   Negative 


 


Urine Bilirubin   Negative 


 


Urine Urobilinogen   0.2-1.0 


 


Ur Leukocyte Esterase   Neg 


 


Urine RBC (Auto)   4 H 


 


Urine Microscopic WBC   < 1 


 


Urine Opiates Screen    Negative


 


Urine Methadone Screen    Negative


 


Ur Barbiturates Screen    Negative


 


Ur Phencyclidine Scrn    Negative


 


Ur Amphetamines Screen    Negative


 


U Benzodiazepines Scrn    Negative


 


U Oth Cocaine Metabols    Negative


 


U Cannabinoids Screen    Negative


 


Alcohol, Quantitative   














  09/19/17 09/19/17





  11:01 12:40


 


WBC  


 


RBC  


 


Hgb  


 


Hct  


 


MCV  


 


MCH  


 


MCHC  


 


RDW  


 


Plt Count  


 


MPV  


 


Neut % (Auto)  


 


Lymph % (Auto)  


 


Mono % (Auto)  


 


Eos % (Auto)  


 


Baso % (Auto)  


 


Neut #  


 


Lymph #  


 


Mono #  


 


Eos #  


 


Baso #  


 


Neutrophils % (Manual)  


 


Lymphocytes % (Manual)  


 


Monocytes % (Manual)  


 


Eosinophils % (Manual)  


 


Basophils % (Manual)  


 


Platelet Estimate  


 


Anisocytosis (manual)  


 


PT  


 


INR  


 


APTT  


 


Sodium  


 


Potassium  


 


Chloride  


 


Carbon Dioxide  


 


Anion Gap  


 


BUN  


 


Creatinine  


 


Est GFR ( Amer)  


 


Est GFR (Non-Af Amer)  


 


POC Glucose (mg/dL)  


 


Random Glucose  


 


Lactic Acid  11.4 H* 


 


Calcium  


 


Magnesium   2.2


 


Total Bilirubin  


 


AST  


 


ALT  


 


Alkaline Phosphatase  


 


Troponin I  


 


Total Protein  


 


Albumin  


 


Globulin  


 


Albumin/Globulin Ratio  


 


Urine Color  


 


Urine Clarity  


 


Urine pH  


 


Ur Specific Gravity  


 


Urine Protein  


 


Urine Glucose (UA)  


 


Urine Ketones  


 


Urine Blood  


 


Urine Nitrate  


 


Urine Bilirubin  


 


Urine Urobilinogen  


 


Ur Leukocyte Esterase  


 


Urine RBC (Auto)  


 


Urine Microscopic WBC  


 


Urine Opiates Screen  


 


Urine Methadone Screen  


 


Ur Barbiturates Screen  


 


Ur Phencyclidine Scrn  


 


Ur Amphetamines Screen  


 


U Benzodiazepines Scrn  


 


U Oth Cocaine Metabols  


 


U Cannabinoids Screen  


 


Alcohol, Quantitative  











EKG/Cardiology Studies: 


Cardiology / EKG Studies





09/19/17 08:16


EKG [ELECTROCARDIOGRAM] Stat 


   Comment: 


   Mode Of Transportation: PORTABLE


   Reason For Exam: SOB











Fingerstick Blood Sugar Results: 130





Review of Systems





- Review of Systems


Systems not reviewed;Unavailable: Uncooperative

## 2017-09-19 NOTE — CARD
--------------- APPROVED REPORT --------------





EKG Measurement

Heart [id number]

[id number]

[id number]

[id number]



<Conclusion>

Atrial fibrillation with rapid ventricular response with premature 

ventricular or aberrantly conducted complexes

Low voltage QRS

Abnormal ECG

## 2017-09-19 NOTE — RAD
HISTORY:

Syncope. Technique: Supine view performed @ 08:25.



COMPARISON:

No prior. 



FINDINGS:



LUNGS:

This perihilar, right lower lobe infiltrate.



PLEURA:

No significant pleural effusion identified, no pneumothorax apparent.



CARDIOVASCULAR:

 No radiographic findings to suggest acute or significant 

cardiovascular disease.



OSSEOUS STRUCTURES:

No significant abnormalities.



VISUALIZED UPPER ABDOMEN:

Normal.



OTHER FINDINGS:

None.



IMPRESSION:

Perihilar right lower lobe infiltrate likely acute/ 

inflammatory-infectious.

## 2017-09-23 ENCOUNTER — HOSPITAL ENCOUNTER (OUTPATIENT)
Dept: HOSPITAL 14 - H.ER | Age: 52
Setting detail: OBSERVATION
Discharge: HOME | End: 2017-09-23
Attending: EMERGENCY MEDICINE | Admitting: EMERGENCY MEDICINE
Payer: SELF-PAY

## 2017-09-23 VITALS
TEMPERATURE: 98 F | OXYGEN SATURATION: 99 % | RESPIRATION RATE: 16 BRPM | SYSTOLIC BLOOD PRESSURE: 109 MMHG | HEART RATE: 90 BPM | DIASTOLIC BLOOD PRESSURE: 75 MMHG

## 2017-09-23 VITALS — BODY MASS INDEX: 20.9 KG/M2

## 2017-09-23 DIAGNOSIS — S00.12XA: ICD-10-CM

## 2017-09-23 DIAGNOSIS — Z79.899: ICD-10-CM

## 2017-09-23 DIAGNOSIS — R56.9: ICD-10-CM

## 2017-09-23 DIAGNOSIS — F10.129: Primary | ICD-10-CM
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
## 2017-09-23 PROCEDURE — 82948 REAGENT STRIP/BLOOD GLUCOSE: CPT

## 2017-09-23 PROCEDURE — 99282 EMERGENCY DEPT VISIT SF MDM: CPT

## 2017-09-23 NOTE — ED PDOC
HPI: Psych/Substance Abuse


Time Seen by Provider: 09/23/17 02:49


Chief Complaint (Nursing): Alcohol Ingestion


Chief Complaint (Provider): Alcohol Intoxication


ED Caveat: Intoxicated


History Per: Patient


History/Exam Limitations: intoxication


Onset/Duration Of Symptoms: Days (x 1)


Current Symptoms Are (Timing): Still Present


Modifying Factor(s): Alcohol


Additional History Per: EMS


Additional Complaint(s): 





52 y/o male was brought the ED by EMS for alcohol intoxication. Patient was 

found publicly intoxicated. Denies any physical complaints. Of note: patient 

has a left periorbital ecchymosis that he was seen for and received full workup 

including a CAT scan in a recent visit.





PMD: Unknown





Past Medical History


Reviewed: Historical Data, Nursing Documentation, Vital Signs, Unable To Obtain


Vital Signs: 





 Last Vital Signs











Temp  98.0 F   09/23/17 02:54


 


Pulse  90   09/23/17 02:54


 


Resp  16   09/23/17 02:54


 


BP  109/75   09/23/17 02:54


 


Pulse Ox  99   09/23/17 02:54














- Medical History


PMH: Seizures (AS PER EMS)


   Denies: HIV, Chronic Kidney Disease





- Family History


Family History: States: Unknown Family Hx





- Immunization History


Hx Tetanus Toxoid Vaccination: No


Hx Influenza Vaccination: No


Hx Pneumococcal Vaccination: No





- Home Medications


Home Medications: 


 Ambulatory Orders











 Medication  Instructions  Recorded


 


levETIRAcetam [Keppra] 500 mg PO BID #60 tab 01/13/16


 


No Known Home Med  09/19/17














- Allergies


Allergies/Adverse Reactions: 


 Allergies











Allergy/AdvReac Type Severity Reaction Status Date / Time


 


No Known Allergies Allergy   Verified 05/07/17 20:26














Review of Systems


Review Of Systems: ROS cannot be obtained secondary to pt's inabilty to answer 

questions.





Physical Exam





- Reviewed


Nursing Documentation Reviewed: Yes


Vital Signs Reviewed: Yes





- Physical Exam


Appears: Positive for: Non-toxic, No Acute Distress


Head Exam: Positive for: ATRAUMATIC, NORMAL INSPECTION, NORMOCEPHALIC


Skin: Positive for: Normal Color, Warm, Dry


Eye Exam: Positive for: Normal appearance, EOMI, PERRL, Other (left periorbital 

ecchymosis, old per patient and records)


ENT: Positive for: Normal ENT Inspection


Neck: Positive for: Normal, Painless ROM


Cardiovascular/Chest: Positive for: Regular Rate, Rhythm.  Negative for: Murmur


Respiratory: Positive for: Normal Breath Sounds.  Negative for: Respiratory 

Distress


Gastrointestinal/Abdominal: Positive for: Normal Exam, Bowel Sounds, Soft.  

Negative for: Tenderness


Back: Positive for: Normal Inspection.  Negative for: L CVA Tenderness, R CVA 

Tenderness, Vertebral Tenderness


Extremity: Positive for: Normal ROM.  Negative for: Pedal Edema, Deformity


Neurologic/Psych: Positive for: Alert (Awake), Gait (Unsteady), Other (Slurred 

speech)





- ECG


O2 Sat by Pulse Oximetry: 99 (RA)


Pulse Ox Interpretation: Normal





Medical Decision Making


Medical Decision Making: 





Time: 3:11


Initial Impression: 52 y/o male with alcohol intoxication


Initial Plan:


--Alcohol Serum Stat


--Accucheck


--Pending sobriety


--Patient admitted to ED OBS for alcohol intoxication


*Check ED OBS for further documentation





--------------------------------------------------------------------------------

-----------------


Scribe Attestation:


Documented by Nestor Jerome, acting as a scribe for Vishnu Mosquera MD





Provider Scribe Attestation:


All medical record entries made by the Scribe were at my direction and 

personally dictated by me. I have reviewed the chart and agree that the record 

accurately reflects my personal performance of the history, physical exam, 

medical decision making, and the department course for this patient. I have 

also personally directed, reviewed, and agree with the discharge instructions 

and disposition.





ED OBSERVATION


Date of observation admission: 09/23/17


Time of observation admission: 03:10





- Observation admission statement


Patient is being placed in observation because:: 





Alcohol intoxication





- Goals of Observation


Goals of observation are:: 





Clinical sobriety





- Progress Note


Progress Note: 





Time: 3:10


--Patient is resting. Vitals are stable.





Time: 4:40


--Patient is resting comfortably with stable vitals.





Time: 6:10


--Patient is currently resting and vitals are stable.





Time: 6:30


--Patient is alert, awake, oriented x3 with a steady gait and fluent speech.


--Patient is medically stable for discharge home





Clinical Impression: Alcohol Intoxication








Disposition





- Clinical Impression


Clinical Impression: 


 Alcohol abuse with intoxication








- Patient ED Disposition


Is Patient to be Admitted: No


Counseled Patient/Family Regarding: Diagnosis, Need For Followup





- Disposition


Disposition: Routine/Home


Disposition Time: 03:10


Condition: STABLE

## 2017-12-21 ENCOUNTER — HOSPITAL ENCOUNTER (EMERGENCY)
Dept: HOSPITAL 14 - H.ER | Age: 52
LOS: 1 days | Discharge: HOME | End: 2017-12-22
Payer: MEDICAID

## 2017-12-21 VITALS — RESPIRATION RATE: 18 BRPM

## 2017-12-21 VITALS — BODY MASS INDEX: 20.9 KG/M2

## 2017-12-21 DIAGNOSIS — F10.129: Primary | ICD-10-CM

## 2017-12-21 DIAGNOSIS — Z59.0: ICD-10-CM

## 2017-12-21 SDOH — ECONOMIC STABILITY - HOUSING INSECURITY: HOMELESSNESS: Z59.0

## 2017-12-21 NOTE — ED PDOC
HPI: Psych/Substance Abuse


Time Seen by Provider: 12/21/17 18:00


Chief Complaint (Nursing): Alcohol Ingestion


Chief Complaint (Provider): Alcohol Ingestion


History Per: EMS


History/Exam Limitations: intoxication


Onset/Duration Of Symptoms: Days (x1)


Current Symptoms Are (Timing): Still Present


Additional Complaint(s): 


Toño Mckeon is a 52 year old male who was brought to the ED by EMS due 

to alcohol intoxication. According to EMS, [place] 

park. Patient is known to EMS and to ED for alcohol abuse and homelessness. 

Patient is intoxicated and unable to give any history. 








Past Medical History


Reviewed: Historical Data, Nursing Documentation, Vital Signs


Vital Signs: 





 Last Vital Signs











Temp  97.8 F   12/21/17 17:53


 


Pulse  98 H  12/21/17 17:53


 


Resp  16   12/21/17 17:53


 


BP  107/70   12/21/17 17:53


 


Pulse Ox  99   12/21/17 17:53














- Medical History


PMH: Seizures (AS PER EMS)


   Denies: HIV, Chronic Kidney Disease





- Family History


Family History: States: Unknown Family Hx





- Immunization History


Hx Tetanus Toxoid Vaccination: No


Hx Influenza Vaccination: No


Hx Pneumococcal Vaccination: No





- Home Medications


Home Medications: 


 Ambulatory Orders











 Medication  Instructions  Recorded


 


levETIRAcetam [Keppra] 500 mg PO BID #60 tab 01/13/16


 


No Known Home Med  09/19/17














- Allergies


Allergies/Adverse Reactions: 


 Allergies











Allergy/AdvReac Type Severity Reaction Status Date / Time


 


No Known Allergies Allergy   Verified 05/07/17 20:26














Review of Systems


Review Of Systems: ROS cannot be obtained secondary to pt's inabilty to answer 

questions. (alcohol intoxication)





Physical Exam





- Reviewed


Nursing Documentation Reviewed: Yes


Vital Signs Reviewed: Yes





- Physical Exam


Appears: Positive for: No Acute Distress (dissheveled and unkempt)


Head Exam: Positive for: ATRAUMATIC, NORMOCEPHALIC


Skin: Positive for: Warm, Dry


Eye Exam: Positive for: PERRL, Conjunctival injection


ENT: Positive for: Pharynx Is (clear)


Neck: Positive for: Painless ROM, Supple


Cardiovascular/Chest: Positive for: Regular Rate, Rhythm, Chest Non Tender.  

Negative for: Murmur


Respiratory: Positive for: Normal Breath Sounds.  Negative for: Wheezing


Gastrointestinal/Abdominal: Positive for: Soft.  Negative for: Tenderness


Back: Positive for: Normal Inspection.  Negative for: Decreased ROM


Extremity: Positive for: Normal ROM, Pedal Edema (bilateral ankle and feet).  

Negative for: Deformity


Lymphatic: Negative for: Adenopathy


Neurologic/Psych: Positive for: Other (deeply sleepy but arousable to painful 

stimuli).  Negative for: Alert, Oriented, Motor/Sensory Deficits





- ECG


O2 Sat by Pulse Oximetry: 99 (RA)


Pulse Ox Interpretation: Normal





Medical Decision Making


Medical Decision Making: 


Time: 18:03


Initial Impression: Alcohol intoxication  


--Alcohol serum


--Glucose, blood, POC


--Pulse OX continuous 


--Reevaluation


   





--------------------------------------------------------------------------------

-----------------


Scribe Attestation:


Documented by Garcia Yang acting as a scribe for Sweta Hernandez MD.


   


MD Scribe Attestation:


All medical record entries made by the Scribe were at my direction and 

personally dictated by me. I have reviewed the chart and agree that the record 

accurately reflects my personal performance of the history, physical exam, 

medical decision making, and the department course for this patient. I have 

also personally directed, reviewed, and agree with the discharge instructions 

and disposition.








Disposition





- Clinical Impression


Clinical Impression: 


 Alcohol use








- Disposition


Disposition: Transfer of Care


Disposition Time: 00:00


Condition: STABLE


Print Language: Citizen of [place]


Patient Signed Over To: Milind Lopes


Handoff Comments: Pending Sobriety and final ER disposition

## 2017-12-22 VITALS — SYSTOLIC BLOOD PRESSURE: 105 MMHG | DIASTOLIC BLOOD PRESSURE: 60 MMHG | TEMPERATURE: 98.5 F | HEART RATE: 97 BPM

## 2017-12-22 VITALS — OXYGEN SATURATION: 99 %

## 2017-12-22 NOTE — ED PDOC
- ECG


O2 Sat by Pulse Oximetry: 95





Medical Decision Making


Medical Decision Making: 





Time: 00:00


--Patient was endorsed to provider by Dr. Sweta Hernandez. Pending clinical 

sobriety.





Time: 0517


--Upon provider reevaluation, patient is awake, alert, medically stable and 

requires no further treatment in the ED at this time. Patient will be 

discharged home. Counseling was provided and all questions were answered 

regarding diagnosis. There is agreement to discharge plan. Return if symptoms 

persist or worsen.





Clinical Impression: Alcohol use





--------------------------------------------------------------------------------

------------------


Scribe Attestation:


Documented by Yudi Benson, acting as a scribe for Milind Lopes MD.





Provider Scribe Attestation:


All medical record entries made by the Scribe were at my direction and 

personally dictated by me. I have reviewed the chart and agree that the record 

accurately reflects my personal performance of the history, physical exam, 

medical decision making, and the department course for this patient. I have 

also personally directed, reviewed, and agree with the discharge instructions 

and disposition.





Disposition


Counseled Patient/Family Regarding: Diagnosis





- Clinical Impression


Clinical Impression: 


 Alcohol use








- POA


Present On Arrival: None





- Disposition


Referrals: 


Alcoholics Anonymous [Outside]


Disposition: Routine/Home


Disposition Time: 05:17


Condition: STABLE


Instructions:  Alcohol Intoxication (DC)


Forms:  [employer] (English)


Print Language: Maltese

## 2018-02-15 ENCOUNTER — HOSPITAL ENCOUNTER (INPATIENT)
Dept: HOSPITAL 14 - H.ER | Age: 53
LOS: 5 days | Discharge: HOME | DRG: 751 | End: 2018-02-20
Attending: INTERNAL MEDICINE | Admitting: INTERNAL MEDICINE
Payer: COMMERCIAL

## 2018-02-15 VITALS — BODY MASS INDEX: 20.9 KG/M2

## 2018-02-15 DIAGNOSIS — F10.239: Primary | ICD-10-CM

## 2018-02-15 DIAGNOSIS — Z59.0: ICD-10-CM

## 2018-02-15 DIAGNOSIS — B85.1: ICD-10-CM

## 2018-02-15 DIAGNOSIS — F17.210: ICD-10-CM

## 2018-02-15 DIAGNOSIS — Z23: ICD-10-CM

## 2018-02-15 DIAGNOSIS — G40.802: ICD-10-CM

## 2018-02-15 DIAGNOSIS — B85.0: ICD-10-CM

## 2018-02-15 LAB
ALBUMIN SERPL-MCNC: 4.3 G/DL (ref 3.5–5)
ALBUMIN/GLOB SERPL: 1.2 {RATIO} (ref 1–2.1)
ALT SERPL-CCNC: 138 U/L (ref 21–72)
AST SERPL-CCNC: 132 U/L (ref 17–59)
BASOPHILS # BLD AUTO: 0.2 K/UL (ref 0–0.2)
BASOPHILS NFR BLD: 2.8 % (ref 0–2)
BUN SERPL-MCNC: 11 MG/DL (ref 9–20)
CALCIUM SERPL-MCNC: 9.4 MG/DL (ref 8.4–10.2)
EOSINOPHIL # BLD AUTO: 0.9 K/UL (ref 0–0.7)
EOSINOPHIL NFR BLD: 14.8 % (ref 0–4)
ERYTHROCYTE [DISTWIDTH] IN BLOOD BY AUTOMATED COUNT: 16.3 % (ref 11.5–14.5)
GFR NON-AFRICAN AMERICAN: > 60
HGB BLD-MCNC: 13.7 G/DL (ref 12–18)
LYMPHOCYTES # BLD AUTO: 1.3 K/UL (ref 1–4.3)
LYMPHOCYTES NFR BLD AUTO: 21.1 % (ref 20–40)
MCH RBC QN AUTO: 29.9 PG (ref 27–31)
MCHC RBC AUTO-ENTMCNC: 33.9 G/DL (ref 33–37)
MCV RBC AUTO: 88.2 FL (ref 80–94)
MONOCYTES # BLD: 0.4 K/UL (ref 0–0.8)
MONOCYTES NFR BLD: 6.8 % (ref 0–10)
NEUTROPHILS # BLD: 3.3 K/UL (ref 1.8–7)
NEUTROPHILS NFR BLD AUTO: 54.5 % (ref 50–75)
NRBC BLD AUTO-RTO: 0.2 % (ref 0–0)
PLATELET # BLD: 268 K/UL (ref 130–400)
PMV BLD AUTO: 8.1 FL (ref 7.2–11.7)
RBC # BLD AUTO: 4.58 MIL/UL (ref 4.4–5.9)
WBC # BLD AUTO: 6.1 K/UL (ref 4.8–10.8)

## 2018-02-15 PROCEDURE — 3E0234Z INTRODUCTION OF SERUM, TOXOID AND VACCINE INTO MUSCLE, PERCUTANEOUS APPROACH: ICD-10-PCS | Performed by: INTERNAL MEDICINE

## 2018-02-15 SDOH — ECONOMIC STABILITY - HOUSING INSECURITY: HOMELESSNESS: Z59.0

## 2018-02-15 NOTE — CT
PROCEDURE:  CT HEAD WITHOUT CONTRAST.



HISTORY:

Seizure 



COMPARISON:

None available. 



TECHNIQUE:

Axial computed tomography images were obtained through the head/brain 

without intravenous contrast.  



Radiation dose:



Total exam DLP = 1277.5 mGy-cm.



This CT exam was performed using one or more of the following dose 

reduction techniques: Automated exposure control, adjustment of the 

mA and/or kV according to patient size, and/or use of iterative 

reconstruction technique.



FINDINGS:



HEMORRHAGE:

No acute parenchymal, subarachnoid or extra-axial hemorrhage. 



BRAIN:

No evidence of large acute infarct. No obvious parenchymal nor 

extra-axial mass or collection identified on this noncontrast study. 

Suspect minimal chronic periventricular white matter ischemic changes 



Mild generalized volume loss.



VENTRICLES:

No obstructive hydrocephalus. 



CALVARIUM:

No obvious acute calvarial fractures. 



Re- demonstrated are soft tissue density changes in the right 

parietal scalp with some overlying skin thickening possibly due to 

chronic scarring however concomitant mild focal area of scalp 

swelling not excluded. Questionable minimal left keyposterior 

superior parietal scalp swelling.



PARANASAL SINUSES:

Mild mucoperiosteal inflammatory changes seen within maxillary antra. 



MASTOID AIR CELLS:

Unremarkable as visualized. No inflammatory changes.



OTHER FINDINGS:

None.



IMPRESSION:

No acute intracranial hemorrhage.  



Suspect minimal chronic periventricular white matter ischemic 

changes. Mild generalized volume loss.



Re- demonstrated are soft tissue density changes in the right 

parietal scalp with some overlying skin thickening possibly due to 

chronic scarring however concomitant mild focal area of scalp 

swelling not excluded. Questionable minimal left keyposterior 

superior parietal scalp swelling.

## 2018-02-15 NOTE — RAD
HISTORY:

Seizure  



COMPARISON:

04/01/2015 



FINDINGS:



LUNGS:

No active pulmonary disease.



PLEURA:

No significant pleural effusion identified, no pneumothorax apparent.



CARDIOVASCULAR:

 No radiographic findings to suggest acute or significant 

cardiovascular disease.



OSSEOUS STRUCTURES:

No significant abnormalities.



VISUALIZED UPPER ABDOMEN:

Normal.



OTHER FINDINGS:

None.



IMPRESSION:

No active disease. No significant interval change compared to the 

prior examination(s).

## 2018-02-15 NOTE — ED PDOC
HPI: Seizure


Time Seen by Provider: 02/15/18 08:31


Chief Complaint (Nursing): Seizure


Chief Complaint (Provider): Seizure


History Per: Patient, EMS


History/Exam Limitations: no limitations


Recent Seizure Activity Began: Unknown


Length Of Seizures (Duration): Unknown


Additional Complaint(s): 





Toño Mckeon is a 52 year old male, with a past medical history of 

alcohol withdrawal seizures, who was brought to the emergency department via 

EMS for seizure onset prior to arrival. Per EMS, they were called by roommate 

who reported he was seizing. Patient was found in a shed behind an abandoned 

house. He denies any medical complaints.





PMD: None provided. 





Past Medical History


Reviewed: Historical Data, Nursing Documentation, Vital Signs


Vital Signs: 


 Last Vital Signs











Temp  98.2 F   02/15/18 11:41


 


Pulse  98 H  02/15/18 11:41


 


Resp  16   02/15/18 11:41


 


BP  121/73   02/15/18 11:41


 


Pulse Ox  96   02/15/18 11:41














- Medical History


PMH: Seizures (AS PER EMS)


   Denies: HIV, Chronic Kidney Disease





- Surgical History


Surgical History: No Surg Hx





- Family History


Family History: States: Unknown Family Hx





- Social History


Current smoker - smoking cessation education provided: Yes (light smoker <10 

cigarettes daily)


Alcohol: > 2 Drinks/Day


Drugs: Denies





- Immunization History


Hx Tetanus Toxoid Vaccination: No


Hx Influenza Vaccination: No


Hx Pneumococcal Vaccination: No





- Home Medications


Home Medications: 


 Ambulatory Orders











 Medication  Instructions  Recorded


 


levETIRAcetam [Keppra] 500 mg PO BID #60 tab 01/13/16


 


No Known Home Med  09/19/17














- Allergies


Allergies/Adverse Reactions: 


 Allergies











Allergy/AdvReac Type Severity Reaction Status Date / Time


 


No Known Allergies Allergy   Verified 05/07/17 20:26














Review of Systems


ROS Statement: Except As Marked, All Systems Reviewed And Found Negative


Neurological: Positive for: Seizures





Physical Exam





- Reviewed


Nursing Documentation Reviewed: Yes


Vital Signs Reviewed: Yes





- Physical Exam


Appears: Positive for: Non-toxic


Head Exam: Positive for: ATRAUMATIC, NORMAL INSPECTION, NORMOCEPHALIC


Skin: Positive for: Normal Color, Warm, Dry


Eye Exam: Positive for: Normal appearance, EOMI, PERRL


Neck: Positive for: Painless ROM, Supple


Cardiovascular/Chest: Positive for: Regular Rate, Rhythm.  Negative for: Murmur


Respiratory: Positive for: Normal Breath Sounds.  Negative for: Respiratory 

Distress


Gastrointestinal/Abdominal: Positive for: Normal Exam, Soft.  Negative for: 

Tenderness, Guarding, Rebound


Back: Positive for: Normal Inspection.  Negative for: L CVA Tenderness, R CVA 

Tenderness, Vertebral Tenderness


Extremity: Positive for: Normal ROM.  Negative for: Deformity, Swelling


Neurologic/Psych: Positive for: Alert, Oriented (x3).  Negative for: Motor/

Sensory Deficits





- Laboratory Results


Result Diagrams: 


 02/15/18 08:50





 02/15/18 08:50





- ECG


Interpretation Of ECG: 





ST @ 104, no ST-T changes.


O2 Sat by Pulse Oximetry: 95 (RA)


Pulse Ox Interpretation: Normal





- Critical Care


Total Time (In Min): 45





Medical Decision Making


Medical Decision Making: 





Initial Impression: Seizure





Initial Plan:





--EKG


--Alcohol serum


--CMP


--CBC w/ differential


--Banana Bag MVI/VITB


--Reevaluation








09:10


--Called to bedside, patient actively seizing, placed on breather and gave 

Ativan 2mg. 








--------------------------------------------------------------------------------

-----------------


Scribe Attestation:


Documented by Primo Evans, acting as a scribe for Linda Beck MD





Provider Scribe Attestation:


All medical record entries made by the Scribe were at my direction and 

personally dictated by me. I have reviewed the chart and agree that the record 

accurately reflects my personal performance of the history, physical exam, 

medical decision making, and the department course for this patient. I have 

also personally directed, reviewed, and agree with the discharge instructions 

and disposition.





Disposition





- Clinical Impression


Clinical Impression: 


 Alcohol withdrawal seizure








- Patient ED Disposition


Is Patient to be Admitted: Yes





- Disposition


Referrals: 


Provider RADHA, [Primary Care Provider] - 


Disposition Time: 11:46


Condition: STABLE


Forms:  [email or web address] (English)





- Pt Status Changed To:


Hospital Disposition Of: Inpatient





- Admit Certification


Admit to Inpatient:: After my assessment, the patient will require 

hospitalization for at least two midnights.  This is because of the severity of 

symptoms shown, intensity of services needed, and/or the medical risk in this 

patient being treated as an outpatient.





- POA


Present On Arrival: None

## 2018-02-16 LAB
ALBUMIN SERPL-MCNC: 4.2 G/DL (ref 3.5–5)
ALBUMIN/GLOB SERPL: 1.3 {RATIO} (ref 1–2.1)
ALT SERPL-CCNC: 116 U/L (ref 21–72)
AST SERPL-CCNC: 91 U/L (ref 17–59)
BUN SERPL-MCNC: 14 MG/DL (ref 9–20)
CALCIUM SERPL-MCNC: 9.3 MG/DL (ref 8.4–10.2)
GFR NON-AFRICAN AMERICAN: > 60

## 2018-02-16 PROCEDURE — HZ52ZZZ INDIVIDUAL PSYCHOTHERAPY FOR SUBSTANCE ABUSE TREATMENT, COGNITIVE-BEHAVIORAL: ICD-10-PCS

## 2018-02-16 NOTE — CP.PCM.CON
History of Present Illness





- History of Present Illness


History of Present Illness: 





Mr. Mckeon is a 52-year-old man, who reports that he was recently in 

[facility] after having a seizure.  He has had 3 other seizures in his 

life.  Last night, he states that he was sleeping and woke up to find people 

taking him in the EMS vehicle.  He was witnessed having a seizure.  According 

to the patient, he drinks several times a week, but denies heavy alcohol use. 

He denies drinking daily.  However, there is report of alcohol withdrawal 

seizures in the past.  The patient denied tongue biting, injury, urinary/bowel 

incontinence with this episode. 





Review of Systems





- Review of Systems


All systems: reviewed and no additional remarkable complaints except





Past Patient History





- Infectious Disease


Hx of Infectious Diseases: None





- Past Medical History & Family History


Past Medical History?: Yes





- Past Social History


Smoking Status: Heavy Smoker > 10 Cigarettes Daily





- CARDIAC


Hx Cardiac Disorders: No





- PULMONARY


Hx Respiratory Disorders: No





- NEUROLOGICAL


Hx Neurological Disorder: Yes


Hx Seizures: Yes





- HEENT


Hx HEENT Problems: No





- RENAL


Hx Chronic Kidney Disease: No





- ENDOCRINE/METABOLIC


Hx Endocrine Disorders: No





- HEMATOLOGICAL/ONCOLOGICAL


Hx Blood Disorders: No


Hx AIDS: No


Hx Human Immunodeficiency Virus (HIV): No





- INTEGUMENTARY


Hx Dermatological Problems: No





- MUSCULOSKELETAL/RHEUMATOLOGICAL


Hx Musculoskeletal Disorders: No


Hx Falls: Yes





- GASTROINTESTINAL


Hx Gastrointestinal Disorders: No





- GENITOURINARY/GYNECOLOGICAL


Hx Genitourinary Disorders: No





- PSYCHIATRIC


Hx Psychophysiologic Disorder: No


Hx Substance Use: No





- SURGICAL HISTORY


Hx Surgeries: No





- ANESTHESIA


Hx Anesthesia: Yes


Hx Anesthesia Reactions: No


Hx Malignant Hyperthermia: No


Has any member of the family had a problem w/ anesthesia?: No





Meds


Allergies/Adverse Reactions: 


 Allergies











Allergy/AdvReac Type Severity Reaction Status Date / Time


 


No Known Allergies Allergy   Verified 05/07/17 20:26














- Medications


Medications: 


 Current Medications





Chlordiazepoxide (Librium)  5 mg PO Q8 [facility]


   Last Admin: 02/16/18 09:44 Dose:  5 mg


Folic Acid (Folic Acid)  1 mg PO DAILY [facility] hospital


   Last Admin: 02/16/18 09:44 Dose:  1 mg


Thiamine HCl (Vitamin B1 Tab)  100 mg PO DAILY Critical access hospital


   Last Admin: 02/16/18 09:44 Dose:  100 mg











Physical Exam





- Constitutional


Appears: Well





- Head Exam


Head Exam: ATRAUMATIC, NORMAL INSPECTION, NORMOCEPHALIC





- Eye Exam


Eye Exam: EOMI, Normal appearance, PERRL





- ENT Exam


ENT Exam: Mucous Membranes Moist, Normal Exam





- Neck Exam


Neck exam: Positive for: Normal Inspection





- Respiratory Exam


Respiratory Exam: Clear to Auscultation Bilateral, NORMAL BREATHING PATTERN





- Cardiovascular Exam


Cardiovascular Exam: REGULAR RHYTHM, +S1, +S2





- Rectal Exam


Rectal Exam: Deferred





- Extremities Exam


Extremities exam: Positive for: normal inspection





- Neurological Exam


Neurological exam: Alert, CN II-XII Intact, Normal Gait, Oriented x3, Reflexes 

Normal





- Psychiatric Exam


Psychiatric exam: Normal Affect, Normal Mood





- Skin


Skin Exam: Dry, Intact, Normal Color, Warm





Results





- Vital Signs


Recent Vital Signs: 


 Last Vital Signs











Temp  98.4 F   02/16/18 12:30


 


Pulse  76   02/16/18 12:30


 


Resp  18   02/16/18 12:30


 


BP  102/66   02/16/18 12:30


 


Pulse Ox  96   02/16/18 12:30














- Labs


Result Diagrams: 


 02/15/18 08:50





 02/16/18 04:31


Labs: 


 Laboratory Results - last 24 hr











  02/15/18 02/16/18





  14:57 04:31


 


Sodium   137


 


Potassium   3.2 L


 


Chloride   98


 


Carbon Dioxide   25


 


Anion Gap   17


 


BUN   14


 


Creatinine   0.6 L


 


Est GFR ( Amer)   > 60


 


Est GFR (Non-Af Amer)   > 60


 


Random Glucose   81


 


Calcium   9.3


 


Total Bilirubin   1.2


 


AST   91 H D


 


ALT   116 H


 


Alkaline Phosphatase   74


 


Total Protein   7.5


 


Albumin   4.2


 


Globulin   3.3


 


Albumin/Globulin Ratio   1.3


 


Urine Opiates Screen  Negative 


 


Urine Methadone Screen  Negative 


 


Ur Barbiturates Screen  Negative 


 


Ur Phencyclidine Scrn  Negative 


 


Ur Amphetamines Screen  Negative 


 


U Benzodiazepines Scrn  Negative 


 


U Oth Cocaine Metabols  Negative 


 


U Cannabinoids Screen  Negative 














- Impressions


Impression: 





Scalp swelling on CT head on the right.





Assessment & Plan


(1) Seizure disorder


Assessment and Plan: 


The patient has now had 4 lifetime seizures and there is concern with high 

degree of likelihood that he will have a 5th, if he is not on seizure 

prophylaxis. Therefor, I recommend starting Keppra 500 mg BID.  He was unable 

to obtain the MRI due to head lice.  An EEG is recommended for further 

evaluation and may be done as an outpatient.





Thank you. 


Status: Acute

## 2018-02-16 NOTE — CARD
--------------- APPROVED REPORT --------------





EKG Measurement

Heart [id number]

UT [id number]

[id number]

[id number]

[id number]



<Conclusion>

Sinus tachycardia

Otherwise normal ECG

## 2018-02-16 NOTE — CP.PCM.HP
History of Present Illness





- History of Present Illness


History of Present Illness: 











CC:  Seizure Disorder.





51 y/o M, admitted to [facility] due to an episode of Seizure Disorder prior to 

arrival to ED.





PMHx of alcohol withdrawal seizure, Homeless, as per roommate, he witnessed Pt 

was seizing and he called EMS who found Pt in an abandoned house and was 

brought him to ER, [facility], [place] for evaluation and Tx.  Unknown length of 

seizure activity.  Pt denied any injury.





While in the ER, Pt had another seizure activity, with foamy mouth, unresponsive

/diaphoretic. 





Worsening symptoms:  Multiple admissions to hospital for Hx of alcohol abuse.   

Not in compliance the the seizure medication.   Also was found with Body Lice 

in head and all the bed sheet, with malodor.  Pt was placed in Isolation as per 

protocol.





Aggravated factor:  On arrival to the ED, Pt did not remember Hx of illness.





CT Head:  No acute intracranial hemorrhage.  Mild focal area of scalp swelling, 

questionable minimal left key posterior superior parietal scalp swelling.





CXR:  No active disease.


. 





Present on Admission





- Present on Admission


Any Indicators Present on Admission: No





Review of Systems





- Review of Systems


All systems: reviewed and no additional remarkable complaints except (HPI)





- Cardiovascular


Cardiovascular: Other (negative)





Past Patient History





- Infectious Disease


Hx of Infectious Diseases: None





- Past Medical History & Family History


Past Medical History?: Yes


Pertinent Family History: 





Unknown





- Past Social History


Smoking Status: Heavy Smoker > 10 Cigarettes Daily


Alcohol: > 2 Drinks/Day


Drugs: Denies





- CARDIAC


Hx Cardiac Disorders: No





- PULMONARY


Hx Respiratory Disorders: No





- NEUROLOGICAL


Hx Neurological Disorder: Yes


Hx Seizures: Yes





- HEENT


Hx HEENT Problems: No





- RENAL


Hx Chronic Kidney Disease: No





- ENDOCRINE/METABOLIC


Hx Endocrine Disorders: No





- HEMATOLOGICAL/ONCOLOGICAL


Hx Blood Disorders: No


Hx AIDS: No


Hx Human Immunodeficiency Virus (HIV): No





- INTEGUMENTARY


Hx Dermatological Problems: No





- MUSCULOSKELETAL/RHEUMATOLOGICAL


Hx Musculoskeletal Disorders: No


Hx Falls: Yes





- GASTROINTESTINAL


Hx Gastrointestinal Disorders: No





- GENITOURINARY/GYNECOLOGICAL


Hx Genitourinary Disorders: No





- PSYCHIATRIC


Hx Psychophysiologic Disorder: No


Hx Substance Use: No





- SURGICAL HISTORY


Hx Surgeries: No





- ANESTHESIA


Hx Anesthesia: Yes


Hx Anesthesia Reactions: No


Hx Malignant Hyperthermia: No


Has any member of the family had a problem w/ anesthesia?: No





Meds


Home Medications: 


 Home Medication List











 Medication  Instructions  Recorded  Confirmed  Type


 


Folic Acid 1 mg PO DAILY #30 tab 02/19/18  Rx


 


Meclizine [Meclizine*] 25 mg PO Q12 #60 tab 02/19/18  Rx


 


Thiamine [Vitamin B1 Tab] 100 mg PO DAILY #30 tab 02/19/18  Rx


 


levETIRAcetam [Keppra] 500 mg PO BID #60 tab 02/19/18  Rx











Allergies/Adverse Reactions: 


 Allergies











Allergy/AdvReac Type Severity Reaction Status Date / Time


 


No Known Allergies Allergy   Verified 05/07/17 20:26














Physical Exam





- Constitutional


Appears: No Acute Distress





- Head Exam


Head Exam: NORMAL INSPECTION





- Eye Exam


Eye Exam: PERRL





- ENT Exam


ENT Exam: Normal Exam





- Neck Exam


Neck exam: Positive for: Normal Inspection





- Respiratory Exam


Respiratory Exam: NORMAL BREATHING PATTERN





- Cardiovascular Exam


Cardiovascular Exam: REGULAR RHYTHM





- GI/Abdominal Exam


GI & Abdominal Exam: Normal Bowel Sounds, Soft





- Back Exam


Back exam: NORMAL INSPECTION





- Neurological Exam


Neurological exam: Alert, Oriented x3





- Psychiatric Exam


Psychiatric exam: Anxious





- Skin


Skin Exam: Warm





Results





- Vital Signs


Recent Vital Signs: 





 Last Vital Signs











Temp  98.4 F   02/16/18 12:30


 


Pulse  76   02/16/18 12:30


 


Resp  18   02/16/18 12:30


 


BP  102/66   02/16/18 12:30


 


Pulse Ox  96   02/16/18 12:30








reviewed JEANETH





- Labs


Result Diagrams: 


 02/19/18 04:25





 02/19/18 04:25


Labs: 





 Laboratory Results - last 24 hr











  02/16/18





  04:31


 


Sodium  137


 


Potassium  3.2 L


 


Chloride  98


 


Carbon Dioxide  25


 


Anion Gap  17


 


BUN  14


 


Creatinine  0.6 L


 


Est GFR ( Amer)  > 60


 


Est GFR (Non-Af Amer)  > 60


 


Random Glucose  81


 


Calcium  9.3


 


Total Bilirubin  1.2


 


AST  91 H D


 


ALT  116 H


 


Alkaline Phosphatase  74


 


Total Protein  7.5


 


Albumin  4.2


 


Globulin  3.3


 


Albumin/Globulin Ratio  1.3








reviewed JAlfredaPAlfreda





- Imaging and Cardiology


  ** Chest x-ray


Status: Report reviewed by me (JEANEHT)





Assessment & Plan


(1) Seizure disorder


Status: Acute   Priority: High   





(2) History of ETOH abuse


Status: Chronic   Priority: High   





(3) Lice infestation


Status: Acute   





- Assessment and Plan (Free Text)


Plan: 





F/U  Brain MRI was not done due to head lice, f/u Head MRA, Neck MRA, Continue 

Kepra, Librium, Folic Acid,Permethrim , PT eval, Neurology consult apreciated.





- Date & Time


Date: 02/16/18


Time: 12:30

## 2018-02-17 LAB
BUN SERPL-MCNC: 13 MG/DL (ref 9–20)
CALCIUM SERPL-MCNC: 9.4 MG/DL (ref 8.4–10.2)
GFR NON-AFRICAN AMERICAN: > 60

## 2018-02-17 RX ADMIN — Medication PRN APPLIC: at 17:19

## 2018-02-17 NOTE — MRI
PROCEDURE:  Magnetic Resonance Angiography Brain



HISTORY:

seizure







COMPARISON:

None available. 



TECHNIQUE:

3D time of flight MR angiography of the intracranial arteries was 

performed. Rotating maximum intensity projection images were 

generated.



FINDINGS:



INTERNAL CAROTID ARTERIES:

Unremarkable. The skull base, petrous, cavernous and supraclinoid 

segments are bilaterally widely patient. 



ANTERIOR CEREBRAL ARTERIES:

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  Both anterior cerebral arteries 

supplied by left circulation through left A1. Smaller distal branches 

unremarkable, as visualized.



MIDDLE CEREBRAL ARTERIES:

Unremarkable. M1 and M2 segments are widely patent. Perisylvian 

branches grossly symmetric.



POSTERIOR CIRCULATION:

Basilar Artery: Unremarkable.



Distal Vertebral Arteries: Unremarkable.



Posterior Cerebral Arteries: Unremarkable.



Posterior Inferior Cerebellar Arteries: Unremarkable.



ANEURYSM/ VASCULAR MALFORMATIONS:

None.



OTHER FINDINGS:

None. 



IMPRESSION:

Absence of the right A1.  Otherwise unremarkable MRA of the brain.

## 2018-02-17 NOTE — MRI
PROCEDURE:  MRI BRAIN WITHOUT CONTRAST



HISTORY:

seizure



COMPARISON:

Comparison is made with the previous study dated 04/15/2014 



TECHNIQUE:

Multiplanar, multisequence MR images of the brain were obtained 

without intravenous contrast enhancement.



FINDINGS:



HEMORRHAGE:

None



DWI:

No evidence of an acute or early subacute infarction.



BRAIN PARENCHYMA:

No mass effect or edema. Mild-to-moderate atrophy is noted.



VENTRICLES:

Unremarkable. No hydrocephalus.



CRANIUM:

Unremarkable.



ORBITS:

Grossly unremarkable.



PARANASAL SINUSES/MASTOIDS:

Clear



VASCULAR SYSTEM:

Skull base flow voids intact.



OTHER FINDINGS:

None. 



IMPRESSION:

There is no evidence of acute pathology or os mass lesion in the 

brain.



Mild-to-moderate atrophy.

## 2018-02-17 NOTE — CP.PCM.PN
Subjective





- Date & Time of Evaluation


Date of Evaluation: 02/17/18


Time of Evaluation: 15:10





- Subjective


Subjective: 





F/U  Seizure Disorder.


complains  of  dizziness on and off , no seizures





Objective





- Vital Signs/Intake and Output


Vital Signs (last 24 hours): 


 











Temp Pulse Resp BP Pulse Ox


 


 98.2 F   79   16   105/66   97 


 


 02/17/18 12:00  02/17/18 12:00  02/17/18 12:00  02/17/18 12:00  02/17/18 12:00











- Medications


Medications: 


 Current Medications





Chlordiazepoxide (Librium)  5 mg PO Q8 [facility]


   Last Admin: 02/17/18 10:30 Dose:  5 mg


Folic Acid (Folic Acid)  1 mg PO DAILY [facility]


   Last Admin: 02/17/18 10:30 Dose:  1 mg


Levetiracetam (Keppra)  500 mg PO BID [facility]


   Last Admin: 02/17/18 10:30 Dose:  500 mg


Thiamine HCl (Vitamin B1 Tab)  100 mg PO DAILY [facility]


   Last Admin: 02/17/18 10:30 Dose:  100 mg











- Labs


Labs: 


 





 02/15/18 08:50 





 02/17/18 06:09 











- Constitutional


Appears: No Acute Distress





- Head Exam


Head Exam: NORMAL INSPECTION





- Eye Exam


Eye Exam: PERRL





- ENT Exam


ENT Exam: Normal Exam





- Neck Exam


Neck Exam: Normal Inspection





- Respiratory Exam


Respiratory Exam: NORMAL BREATHING PATTERN





- Cardiovascular Exam


Cardiovascular Exam: REGULAR RHYTHM





- GI/Abdominal Exam


GI & Abdominal Exam: Soft, Normal Bowel Sounds





- Extremities Exam


Extremities Exam: Normal Inspection





- Back Exam


Back Exam: NORMAL INSPECTION





- Neurological Exam


Neurological Exam: Alert, Oriented x3.  absent: Motor Sensory Deficit





- Psychiatric Exam


Psychiatric exam: Anxious





- Skin


Skin Exam: Warm





Assessment and Plan


(1) Seizure disorder


Status: Acute   





(2) History of ETOH abuse


Status: Chronic

## 2018-02-18 RX ADMIN — Medication PRN APPLIC: at 00:19

## 2018-02-18 RX ADMIN — Medication PRN APPLIC: at 18:47

## 2018-02-18 RX ADMIN — Medication PRN APPLIC: at 09:40

## 2018-02-18 NOTE — CP.PCM.PN
Subjective





- Date & Time of Evaluation


Date of Evaluation: 02/18/18





- Subjective


Subjective: 





F/U seizure Disorder.


dizziness





Objective





- Vital Signs/Intake and Output


Vital Signs (last 24 hours): 


 











Temp Pulse Resp BP Pulse Ox


 


 98.2 F   63   17   109/67   98 


 


 02/18/18 15:52  02/18/18 15:52  02/18/18 15:52  02/18/18 15:52  02/18/18 15:52











- Medications


Medications: 


 Current Medications





Chlordiazepoxide (Librium)  5 mg PO Q8 [facility]


   Last Admin: 02/18/18 09:24 Dose:  5 mg


Folic Acid (Folic Acid)  1 mg PO DAILY [facility]


   Last Admin: 02/18/18 09:40 Dose:  1 mg


Levetiracetam (Keppra)  500 mg PO BID [facility]


   Last Admin: 02/18/18 09:40 Dose:  500 mg


Meclizine HCl (Antivert)  25 mg PO Q12 [facility]


   Last Admin: 02/18/18 09:40 Dose:  25 mg


Thiamine HCl (Vitamin B1 Tab)  100 mg PO DAILY [facility]


   Last Admin: 02/18/18 09:40 Dose:  100 mg


Zinc Acetate/Diphenhydramine (Benadryl 1% Zinc Acetate -0.1%)  1 applic TOP Q8 

PRN


   PRN Reason: Itching / Pruritus


   Last Admin: 02/18/18 09:40 Dose:  1 applic











- Labs


Labs: 


 





 02/15/18 08:50 





 02/17/18 06:09 











- Constitutional


Appears: No Acute Distress





- Head Exam


Head Exam: NORMAL INSPECTION





- Eye Exam


Eye Exam: PERRL





- ENT Exam


ENT Exam: Normal Exam





- Neck Exam


Neck Exam: Normal Inspection





- Respiratory Exam


Respiratory Exam: NORMAL BREATHING PATTERN





- Cardiovascular Exam


Cardiovascular Exam: REGULAR RHYTHM





- GI/Abdominal Exam


GI & Abdominal Exam: Soft, Normal Bowel Sounds





- Extremities Exam


Extremities Exam: Normal Inspection





- Back Exam


Back Exam: NORMAL INSPECTION





- Neurological Exam


Neurological Exam: Alert, Oriented x3





- Psychiatric Exam


Psychiatric exam: Anxious





- Skin


Skin Exam: Warm





Assessment and Plan


(1) Seizure disorder


Status: Acute   





(2) History of ETOH abuse


Status: Chronic   





(3) Lice infestation


Status: Acute   





- Assessment and Plan (Free Text)


Plan: 





Head  MRA absesnce A1 , neck MRA neg , continue  Keppra

## 2018-02-18 NOTE — CP.PCM.PN
Subjective





- Date & Time of Evaluation


Date of Evaluation: 02/18/18


Time of Evaluation: 08:53





- Subjective


Subjective: 





Mr. Mckeon was seen and examined at the bedside. He is alert, oriented in 

all spheres. He denies any headache, nausea, or vomiting, but claims of 

dizziness with the room spinning with each movement of his head and aggravated 

with change of position.  He further states of the dizziness started yesterday, 

his systolic blood pressure was on the upper 90, Currently, its 116/61. There 

was no untoward events overnight.





Objective





- Vital Signs/Intake and Output


Vital Signs (last 24 hours): 


 











Temp Pulse Resp BP Pulse Ox


 


 97.5 F L  64   16   118/61   97 


 


 02/18/18 08:11  02/18/18 08:11  02/18/18 08:11  02/18/18 08:11  02/18/18 08:11











- Medications


Medications: 


 Current Medications





Chlordiazepoxide (Librium)  5 mg PO Q8 [facility]


   Last Admin: 02/18/18 00:12 Dose:  5 mg


Folic Acid (Folic Acid)  1 mg PO DAILY [facility]


   Last Admin: 02/17/18 10:30 Dose:  1 mg


Levetiracetam (Keppra)  500 mg PO BID [facility]


   Last Admin: 02/17/18 16:03 Dose:  500 mg


Thiamine HCl (Vitamin B1 Tab)  100 mg PO DAILY [facility]


   Last Admin: 02/17/18 10:30 Dose:  100 mg


Zinc Acetate/Diphenhydramine (Benadryl 1% Zinc Acetate -0.1%)  1 applic TOP Q8 

PRN


   PRN Reason: Itching / Pruritus


   Last Admin: 02/18/18 00:19 Dose:  1 applic











- Labs


Labs: 


 





 02/15/18 08:50 





 02/17/18 06:09 











- Constitutional


Appears: No Acute Distress





- Head Exam


Head Exam: NORMAL INSPECTION





- Neurological Exam


Neurological Exam: Alert, Awake, Oriented x3


Neuro motor strength exam: Left Upper Extremity: 5, Right Upper Extremity: 5, 

Left Lower Extremity: 5, Right Lower Extremity: 5


Additional comments: 





He is able to follow commands, sensation remains intact, but with dizziness 

with positional change.





Assessment and Plan


(1) Seizure disorder


Assessment & Plan: 


Case discussed with Dr. Chatman, continue all current medical regimen. Pending 

EEG.


Status: Acute   





(2) Dizziness


Assessment & Plan: 


Case discussed with Dr. Chatman, recommend to do CTA of the head and neck to rule 

out VBI and start on meclizine 25 mg PO Q 12. Recommend physical therapy for 

vestiular rehab.


Status: Acute

## 2018-02-18 NOTE — MRI
PROCEDURE:  MR Angiography of the neck  without contrast



HISTORY:

seizure



COMPARISON:

None available. 



TECHNIQUE:

Contrast enhanced and [id number] angiography of the neck was 

performed. Rotating 3D maximum intensity projection images of the 

cervical carotid and vertebral arteries were generated.  



FINDINGS:



RIGHT CAROTID ARTERIES:

Common Carotid Artery: Normal.



Carotid Bifurcation: Normal.



Internal Carotid Artery:Normal.



External Carotid Artery (proximal branches): Normal.



LEFT CAROTID ARTERIES:

Common Carotid Artery: Normal.



Carotid Bifurcation: Normal.



Internal Carotid Artery:Normal.



External Carotid Artery (proximal branches): Normal.



VERTEBRAL ARTERIES:

Right Vertebral Artery: Normal.



Left Vertebral Artery: Normal.



OTHER FINDINGS:

None.



IMPRESSION:

Normal MR Angiography of the neck.

## 2018-02-19 LAB
ALBUMIN SERPL-MCNC: 4.2 G/DL (ref 3.5–5)
ALBUMIN/GLOB SERPL: 1.2 {RATIO} (ref 1–2.1)
ALT SERPL-CCNC: 90 U/L (ref 21–72)
AST SERPL-CCNC: 62 U/L (ref 17–59)
BUN SERPL-MCNC: 16 MG/DL (ref 9–20)
CALCIUM SERPL-MCNC: 9.4 MG/DL (ref 8.4–10.2)
ERYTHROCYTE [DISTWIDTH] IN BLOOD BY AUTOMATED COUNT: 16.3 % (ref 11.5–14.5)
GFR NON-AFRICAN AMERICAN: > 60
HGB BLD-MCNC: 13.9 G/DL (ref 12–18)
MAGNESIUM SERPL-MCNC: 1.9 MG/DL (ref 1.6–2.3)
MCH RBC QN AUTO: 29.3 PG (ref 27–31)
MCHC RBC AUTO-ENTMCNC: 32.6 G/DL (ref 33–37)
MCV RBC AUTO: 89.9 FL (ref 80–94)
PLATELET # BLD: 272 K/UL (ref 130–400)
RBC # BLD AUTO: 4.74 MIL/UL (ref 4.4–5.9)
WBC # BLD AUTO: 6.1 K/UL (ref 4.8–10.8)

## 2018-02-19 RX ADMIN — Medication PRN APPLIC: at 09:26

## 2018-02-19 NOTE — CP.PCM.PN
Subjective





- Date & Time of Evaluation


Date of Evaluation: 02/19/18


Time of Evaluation: 13:40





- Subjective


Subjective: 





F/U  Seizure


Dizziness





Objective





- Vital Signs/Intake and Output


Vital Signs (last 24 hours): 


 











Temp Pulse Resp BP Pulse Ox


 


 98.4 F   73   18   102/61   99 


 


 02/19/18 12:07  02/19/18 12:07  02/19/18 12:07  02/19/18 12:07  02/19/18 12:07











- Medications


Medications: 


 Current Medications





Folic Acid (Folic Acid)  1 mg PO DAILY [facility]


   Last Admin: 02/19/18 09:18 Dose:  1 mg


Levetiracetam (Keppra)  500 mg PO BID [facility]


   Last Admin: 02/19/18 09:17 Dose:  500 mg


Meclizine HCl (Antivert)  25 mg PO Q12 [facility]


   Last Admin: 02/19/18 09:17 Dose:  25 mg


Thiamine HCl (Vitamin B1 Tab)  100 mg PO DAILY [facility]


   Last Admin: 02/19/18 09:17 Dose:  100 mg


Zinc Acetate/Diphenhydramine (Benadryl 1% Zinc Acetate -0.1%)  1 applic TOP Q8 

PRN


   PRN Reason: Itching / Pruritus


   Last Admin: 02/19/18 09:26 Dose:  1 applic











- Labs


Labs: 


 





 02/19/18 04:25 





 02/19/18 04:25 











- Constitutional


Appears: No Acute Distress





- Head Exam


Head Exam: NORMAL INSPECTION





- Eye Exam


Eye Exam: PERRL





- ENT Exam


ENT Exam: Normal Exam





- Neck Exam


Neck Exam: Normal Inspection





- Respiratory Exam


Respiratory Exam: NORMAL BREATHING PATTERN





- Cardiovascular Exam


Cardiovascular Exam: REGULAR RHYTHM





- GI/Abdominal Exam


GI & Abdominal Exam: Soft, Normal Bowel Sounds





- Extremities Exam


Extremities Exam: Normal Inspection





- Back Exam


Back Exam: NORMAL INSPECTION





- Neurological Exam


Neurological Exam: Alert, Oriented x3.  absent: Motor Sensory Deficit





- Psychiatric Exam


Psychiatric exam: Anxious





- Skin


Skin Exam: Normal Color, Warm





Assessment and Plan


(1) Seizure disorder


Status: Acute   





(2) History of ETOH abuse


Status: Chronic   





(3) Lice infestation


Status: Acute   





- Assessment and Plan (Free Text)


Plan: 





continue  Keppra , f/u CTA Head  and  Neck , attempt  to discharge  if  Negative

## 2018-02-19 NOTE — CT
PROCEDURE:  CT Angiography of the Brain.



HISTORY:

to r/o VBI



COMPARISON:

None available. 



TECHNIQUE:

CT angiography of the intracranial arteries was performed. Coronal 

and sagittal maximum intensity projection reformated images were 

generated.



This CT exam was performed using one or more of the following dose 

reduction techniques: Automated exposure control, adjustment of the 

mA and/or kV according to patient size, and/or use of iterative 

reconstruction technique.



FINDINGS:



INTERNAL CEREBRAL ARTERIES:

Unremarkable. The skull base, petrous, cavernous and supraclinoid 

segments are bilaterally widely patent. 



ANTERIOR CEREBRAL ARTERIES:

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  

Left A1 and bilateral A2 HOLLY segments appear widely patent without 

significant stenosis with smaller distal branches unremarkable, as 

visualized.



MIDDLE CEREBRAL ARTERIES:

Unremarkable. M1 and M2 segments are widely patent. Perisylvian 

branches grossly symmetric.



POSTERIOR CIRCULATION:

Basilar Artery: Unremarkable. Vertebrobasilar circulation appears 

left dominant.



Distal Vertebral Arteries: Unremarkable.



Posterior Cerebral Arteries: Unremarkable.



Posterior Inferior Cerebellar Arteries: Unremarkable.



NECK CTA:



Common Carotid arteries: The bilateral common carotid appear widely 

patent from their origins to their bifurcations with no significant 

stenosis appreciated. No evidence to suggest common carotid artery 

dissection. Mild right carotid bulbar atherosclerosis is identified. 



Internal Carotid arteries: No significant stenosis is appreciated 

throughout the cervical internal carotid artery segments bilaterally 

and there is no evidence of dissection either. Trace left and 

mild-to-moderate proximal right ICA atherosclerosis is appreciated 

without significant stenosis resulting. 



External Carotid arteries: Appear unremarkable bilaterally. 



Vertebral arteries: The bilateral vertebral arteries appear normal in 

caliber from their origins to their junction with the basilar artery. 

Vertebrobasilar system appears  dominant. No significant stenosis or 

definite pattern of dissection.  



Incidentally, the bilateral subclavian arteries are widely patent as 

well as the brachiocephalic artery. 



ANEURYSM/ VASCULAR MALFORMATIONS:

None.



OTHER FINDINGS:

None. 



IMPRESSION:

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 

segment as compared prior intracranial MR angiogram dated 02/17/2018. 

Remainder of the head CT angiogram is unremarkable.



Right carotid bulbar and bilateral proximal ICA atherosclerosis 

without significant stenosis resulting.

## 2018-02-19 NOTE — CP.PCM.DIS
Provider





- Provider


Date of Admission: 


02/15/18 11:46





Attending physician: 


Mac Hart MD





Primary care physician: 


Shad TBD








Diagnosis





- Discharge Diagnosis


(1) Seizure disorder


Status: Acute   Priority: High   





(2) History of ETOH abuse


Status: Chronic   Priority: High   





Hospital Course





- Lab Results


Lab Results: 


 Most Recent Lab Values











WBC  6.1 K/uL (4.8-10.8)   02/19/18  04:25    


 


RBC  4.74 Mil/uL (4.40-5.90)   02/19/18  04:25    


 


Hgb  13.9 g/dL (12.0-18.0)   02/19/18  04:25    


 


Hct  42.6 % (35.0-51.0)   02/19/18  04:25    


 


MCV  89.9 fl (80.0-94.0)   02/19/18  04:25    


 


MCH  29.3 pg (27.0-31.0)   02/19/18  04:25    


 


MCHC  32.6 g/dL (33.0-37.0)  L  02/19/18  04:25    


 


RDW  16.3 % (11.5-14.5)  H  02/19/18  04:25    


 


Plt Count  272 K/uL (130-400)   02/19/18  04:25    


 


MPV  8.1 fl (7.2-11.7)   02/15/18  08:50    


 


Neut % (Auto)  54.5 % (50.0-75.0)   02/15/18  08:50    


 


Lymph % (Auto)  21.1 % (20.0-40.0)   02/15/18  08:50    


 


Mono % (Auto)  6.8 % (0.0-10.0)   02/15/18  08:50    


 


Eos % (Auto)  14.8 % (0.0-4.0)  H  02/15/18  08:50    


 


Baso % (Auto)  2.8 % (0.0-2.0)  H  02/15/18  08:50    


 


Neut # (Auto)  3.3 K/uL (1.8-7.0)   02/15/18  08:50    


 


Lymph # (Auto)  1.3 K/uL (1.0-4.3)   02/15/18  08:50    


 


Mono # (Auto)  0.4 K/uL (0.0-0.8)   02/15/18  08:50    


 


Eos # (Auto)  0.9 K/uL (0.0-0.7)  H  02/15/18  08:50    


 


Baso # (Auto)  0.2 K/uL (0.0-0.2)   02/15/18  08:50    


 


Sodium  139 mmol/l (132-148)   02/19/18  04:25    


 


Potassium  4.1 MMOL/L (3.6-5.0)   02/19/18  04:25    


 


Chloride  101 mmol/L ()   02/19/18  04:25    


 


Carbon Dioxide  23 mmol/L (22-30)   02/19/18  04:25    


 


Anion Gap  19  (10-20)   02/19/18  04:25    


 


BUN  16 mg/dl (9-20)   02/19/18  04:25    


 


Creatinine  0.7 mg/dl (0.8-1.5)  L  02/19/18  04:25    


 


Est GFR ( Amer)  > 60   02/19/18  04:25    


 


Est GFR (Non-Af Amer)  > 60   02/19/18  04:25    


 


POC Glucose (mg/dL)  80 mg/dL ()   02/15/18  08:51    


 


Random Glucose  89 mg/dL ()   02/19/18  04:25    


 


Calcium  9.4 mg/dL (8.4-10.2)   02/19/18  04:25    


 


Magnesium  1.9 MG/DL (1.6-2.3)   02/19/18  04:25    


 


Total Bilirubin  0.6 mg/dl (0.2-1.3)   02/19/18  04:25    


 


AST  62 U/L (17-59)  H D 02/19/18  04:25    


 


ALT  90 U/L (21-72)  H D 02/19/18  04:25    


 


Alkaline Phosphatase  64 U/L ()   02/19/18  04:25    


 


Total Protein  7.7 G/DL (6.3-8.2)   02/19/18  04:25    


 


Albumin  4.2 g/dL (3.5-5.0)   02/19/18  04:25    


 


Globulin  3.5 gm/dL (2.2-3.9)   02/19/18  04:25    


 


Albumin/Globulin Ratio  1.2  (1.0-2.1)   02/19/18  04:25    


 


Urine Opiates Screen  Negative  (NEGATIVE)   02/15/18  14:57    


 


Urine Methadone Screen  Negative  (NEGATIVE)   02/15/18  14:57    


 


Ur Barbiturates Screen  Negative  (NEGATIVE)   02/15/18  14:57    


 


Ur Phencyclidine Scrn  Negative  (NEGATIVE)   02/15/18  14:57    


 


Ur Amphetamines Screen  Negative  (NEGATIVE)   02/15/18  14:57    


 


U Benzodiazepines Scrn  Negative  (NEGATIVE)   02/15/18  14:57    


 


U Oth Cocaine Metabols  Negative  (NEGATIVE)   02/15/18  14:57    


 


U Cannabinoids Screen  Negative  (NEGATIVE)   02/15/18  14:57    


 


Alcohol, Quantitative  < 10 mg/dl (0-10)   02/15/18  08:50    














Discharge Exam





- Head Exam


Head Exam: NORMAL INSPECTION





Discharge Plan





- Discharge Medications


Prescriptions: 


Folic Acid 1 mg PO DAILY #30 tab


levETIRAcetam [Keppra] 500 mg PO BID #60 tab


Meclizine [Meclizine*] 25 mg PO Q12 #60 tab


Thiamine [Vitamin B1 Tab] 100 mg PO DAILY #30 tab





- Follow Up Plan


Condition: STABLE


Disposition: HOME/ ROUTINE


Instructions:  Seizures, Adult (DC), Dizziness, Nonvertigo, (DC), Alcohol Abuse 

and Alcoholism (DC)


Additional Instructions: 


follow up with pmd in 1 week


Referrals: 


Provider TBD, [Primary Care Provider] - 


Merrick Sorto MD [Family Provider] - 


Ankush Chatman MD [Medical Doctor] -

## 2018-02-19 NOTE — CP.PCM.PN
Subjective





- Date & Time of Evaluation


Date of Evaluation: 02/19/18


Time of Evaluation: 10:02





- Subjective


Subjective: 





Mr. Mckeon was seen and examined at the bedside. He is alert, oriented in 

all spheres. He denies any headache, blurred vision, diplopia, nausea, or 

vomiting. He further states that his dizziness improved. He is able to follow 

simple commands. He further states of occasional itchiness which he is 

currently on medication. There was no untoward events overnight.





Objective





- Vital Signs/Intake and Output


Vital Signs (last 24 hours): 


 











Temp Pulse Resp BP Pulse Ox


 


 98.1 F   86   18   97/60 L  97 


 


 02/19/18 07:43  02/19/18 07:43  02/19/18 07:43  02/19/18 07:43  02/19/18 07:43











- Medications


Medications: 


 Current Medications





Folic Acid (Folic Acid)  1 mg PO DAILY [facility]


   Last Admin: 02/19/18 09:18 Dose:  1 mg


Levetiracetam (Keppra)  500 mg PO BID [facility]


   Last Admin: 02/19/18 09:17 Dose:  500 mg


Meclizine HCl (Antivert)  25 mg PO Q12 [facility]


   Last Admin: 02/19/18 09:17 Dose:  25 mg


Thiamine HCl (Vitamin B1 Tab)  100 mg PO DAILY [facility]


   Last Admin: 02/19/18 09:17 Dose:  100 mg


Zinc Acetate/Diphenhydramine (Benadryl 1% Zinc Acetate -0.1%)  1 applic TOP Q8 

PRN


   PRN Reason: Itching / Pruritus


   Last Admin: 02/19/18 09:26 Dose:  1 applic











- Labs


Labs: 


 





 02/19/18 04:25 





 02/19/18 04:25 











- Constitutional


Appears: No Acute Distress





- Head Exam


Head Exam: NORMAL INSPECTION





- Neurological Exam


Neurological Exam: Alert, Awake, Oriented x3


Neuro motor strength exam: Left Upper Extremity: 5, Right Upper Extremity: 5, 

Left Lower Extremity: 5, Right Lower Extremity: 5


Additional comments: 





He is alert, oriented, follows simple commands, and sensation remains intact.





Assessment and Plan


(1) Seizure disorder


Assessment & Plan: 


Case discussed with Dr. Davila, continue all current medical regimen.


Status: Acute   





(2) Dizziness


Assessment & Plan: 


Case discussed with Dr. Davila, continue all current medical and physical 

therapies. Pending CTA of the head and neck. Recommend increase PO hydration to 

alleviate dizziness.


Status: Acute

## 2018-02-20 VITALS
HEART RATE: 73 BPM | OXYGEN SATURATION: 98 % | SYSTOLIC BLOOD PRESSURE: 98 MMHG | TEMPERATURE: 98.1 F | DIASTOLIC BLOOD PRESSURE: 65 MMHG | RESPIRATION RATE: 20 BRPM

## 2018-02-20 NOTE — CP.PCM.PN
Subjective





- Date & Time of Evaluation


Date of Evaluation: 02/20/18


Time of Evaluation: 10:50





- Subjective


Subjective: 





F/U  Seizure





Objective





- Vital Signs/Intake and Output


Vital Signs (last 24 hours): 


 











Temp Pulse Resp BP Pulse Ox


 


 98.7 F   106 H  18   92/62 L  96 


 


 02/20/18 12:14  02/20/18 12:14  02/20/18 12:14  02/20/18 12:14  02/20/18 12:14











- Medications


Medications: 


 Current Medications





Folic Acid (Folic Acid)  1 mg PO DAILY [facility]


   Last Admin: 02/20/18 08:10 Dose:  1 mg


Levetiracetam (Keppra)  500 mg PO BID [facility]


   Last Admin: 02/20/18 08:10 Dose:  500 mg


Meclizine HCl (Antivert)  25 mg PO Q12 [facility]


   Last Admin: 02/20/18 08:10 Dose:  25 mg


Thiamine HCl (Vitamin B1 Tab)  100 mg PO DAILY [facility]


   Last Admin: 02/20/18 08:10 Dose:  100 mg


Zinc Acetate/Diphenhydramine (Benadryl 1% Zinc Acetate -0.1%)  1 applic TOP Q8 

PRN


   PRN Reason: Itching / Pruritus


   Last Admin: 02/19/18 09:26 Dose:  1 applic











- Labs


Labs: 


 





 02/19/18 04:25 





 02/19/18 04:25 











- Constitutional


Appears: No Acute Distress





- Head Exam


Head Exam: NORMAL INSPECTION





- Eye Exam


Eye Exam: PERRL





- ENT Exam


ENT Exam: Normal Exam





- Neck Exam


Neck Exam: Normal Inspection





- Respiratory Exam


Respiratory Exam: NORMAL BREATHING PATTERN





- Cardiovascular Exam


Cardiovascular Exam: REGULAR RHYTHM





- GI/Abdominal Exam


GI & Abdominal Exam: Soft, Normal Bowel Sounds





- Extremities Exam


Extremities Exam: Normal Inspection





- Back Exam


Back Exam: NORMAL INSPECTION





- Neurological Exam


Neurological Exam: Alert, Oriented x3





- Psychiatric Exam


Psychiatric exam: Anxious





- Skin


Skin Exam: Warm





Assessment and Plan


(1) Seizure disorder


Status: Acute   





(2) History of ETOH abuse


Status: Chronic   





(3) Lice infestation


Status: Acute

## 2018-02-20 NOTE — CP.PCM.DIS
Provider





- Provider


Date of Admission: 


02/15/18 11:46





Attending physician: 


Mac Hart MD





Primary care physician: 


Provider TBD








Diagnosis





- Discharge Diagnosis


(1) Seizure disorder


Status: Acute   Priority: High   





(2) History of ETOH abuse


Status: Chronic   Priority: High   





(3) Lice infestation


Status: Acute   





Hospital Course





- Lab Results


Lab Results: 


 Most Recent Lab Values











WBC  6.1 K/uL (4.8-10.8)   02/19/18  04:25    


 


RBC  4.74 Mil/uL (4.40-5.90)   02/19/18  04:25    


 


Hgb  13.9 g/dL (12.0-18.0)   02/19/18  04:25    


 


Hct  42.6 % (35.0-51.0)   02/19/18  04:25    


 


MCV  89.9 fl (80.0-94.0)   02/19/18  04:25    


 


MCH  29.3 pg (27.0-31.0)   02/19/18  04:25    


 


MCHC  32.6 g/dL (33.0-37.0)  L  02/19/18  04:25    


 


RDW  16.3 % (11.5-14.5)  H  02/19/18  04:25    


 


Plt Count  272 K/uL (130-400)   02/19/18  04:25    


 


MPV  8.1 fl (7.2-11.7)   02/15/18  08:50    


 


Neut % (Auto)  54.5 % (50.0-75.0)   02/15/18  08:50    


 


Lymph % (Auto)  21.1 % (20.0-40.0)   02/15/18  08:50    


 


Mono % (Auto)  6.8 % (0.0-10.0)   02/15/18  08:50    


 


Eos % (Auto)  14.8 % (0.0-4.0)  H  02/15/18  08:50    


 


Baso % (Auto)  2.8 % (0.0-2.0)  H  02/15/18  08:50    


 


Neut # (Auto)  3.3 K/uL (1.8-7.0)   02/15/18  08:50    


 


Lymph # (Auto)  1.3 K/uL (1.0-4.3)   02/15/18  08:50    


 


Mono # (Auto)  0.4 K/uL (0.0-0.8)   02/15/18  08:50    


 


Eos # (Auto)  0.9 K/uL (0.0-0.7)  H  02/15/18  08:50    


 


Baso # (Auto)  0.2 K/uL (0.0-0.2)   02/15/18  08:50    


 


Sodium  139 mmol/l (132-148)   02/19/18  04:25    


 


Potassium  4.1 MMOL/L (3.6-5.0)   02/19/18  04:25    


 


Chloride  101 mmol/L ()   02/19/18  04:25    


 


Carbon Dioxide  23 mmol/L (22-30)   02/19/18  04:25    


 


Anion Gap  19  (10-20)   02/19/18  04:25    


 


BUN  16 mg/dl (9-20)   02/19/18  04:25    


 


Creatinine  0.7 mg/dl (0.8-1.5)  L  02/19/18  04:25    


 


Est GFR ( Amer)  > 60   02/19/18  04:25    


 


Est GFR (Non-Af Amer)  > 60   02/19/18  04:25    


 


POC Glucose (mg/dL)  80 mg/dL ()   02/15/18  08:51    


 


Random Glucose  89 mg/dL ()   02/19/18  04:25    


 


Calcium  9.4 mg/dL (8.4-10.2)   02/19/18  04:25    


 


Magnesium  1.9 MG/DL (1.6-2.3)   02/19/18  04:25    


 


Total Bilirubin  0.6 mg/dl (0.2-1.3)   02/19/18  04:25    


 


AST  62 U/L (17-59)  H D 02/19/18  04:25    


 


ALT  90 U/L (21-72)  H D 02/19/18  04:25    


 


Alkaline Phosphatase  64 U/L ()   02/19/18  04:25    


 


Total Protein  7.7 G/DL (6.3-8.2)   02/19/18  04:25    


 


Albumin  4.2 g/dL (3.5-5.0)   02/19/18  04:25    


 


Globulin  3.5 gm/dL (2.2-3.9)   02/19/18  04:25    


 


Albumin/Globulin Ratio  1.2  (1.0-2.1)   02/19/18  04:25    


 


Urine Opiates Screen  Negative  (NEGATIVE)   02/15/18  14:57    


 


Urine Methadone Screen  Negative  (NEGATIVE)   02/15/18  14:57    


 


Ur Barbiturates Screen  Negative  (NEGATIVE)   02/15/18  14:57    


 


Ur Phencyclidine Scrn  Negative  (NEGATIVE)   02/15/18  14:57    


 


Ur Amphetamines Screen  Negative  (NEGATIVE)   02/15/18  14:57    


 


U Benzodiazepines Scrn  Negative  (NEGATIVE)   02/15/18  14:57    


 


U Oth Cocaine Metabols  Negative  (NEGATIVE)   02/15/18  14:57    


 


U Cannabinoids Screen  Negative  (NEGATIVE)   02/15/18  14:57    


 


Alcohol, Quantitative  < 10 mg/dl (0-10)   02/15/18  08:50    














Discharge Exam





- Head Exam


Head Exam: NORMAL INSPECTION





Discharge Plan





- Discharge Medications


Prescriptions: 


Folic Acid 1 mg PO DAILY #30 tab


levETIRAcetam [Keppra] 500 mg PO BID #60 tab


Meclizine [Meclizine*] 25 mg PO Q12 #60 tab


Thiamine [Vitamin B1 Tab] 100 mg PO DAILY #30 tab





- Follow Up Plan


Condition: STABLE


Disposition: HOME/ ROUTINE


Instructions:  Seizures, Adult (DC), Dizziness, Nonvertigo, (DC), Alcohol Abuse 

and Alcoholism (DC)


Additional Instructions: 


follow up with pmd in 1 week


Referrals: 


Provider TBD, [Primary Care Provider] - 


Merrick Sorto MD [Family Provider] - 


Ankush Chatman MD [Medical Doctor] -

## 2018-02-20 NOTE — EEG
ELECTROENCEPHALOGRAM REPORT



DATE:  02/19/2018



This is an EEG required using 15 electrodes placed by the International

10-20 electrode spacing system.  Continuous seizure monitoring was done

with spike detection system.  All electrodes were referenced to A1/A2 and

P1/P2 electrodes.  Background rhythm consisted of normal posterior dominant

rhythm that was 8 to 10 Hz, reactive, symmetric, and attenuates to eye

opening.  There is a profoundly increased amount of beta present in all

fields.  There were no focal epileptiform discharges.  There was no sleep

captured.  There was no focal slowing.  There were no clinical or

subclinical seizures.  Activation maneuver does not produce any abnormal

activity.



IMPRESSION:  This is a normal awake and drowsy electroencephalogram;

however, the presence of increased beta indicates medication effect. 

Clinical correlation is required.





__________________________________________

Gudelia Davila MD





DD:  02/19/2018 12:53:47

DT:  02/19/2018 13:18:13

Job # [id number]

## 2018-05-12 ENCOUNTER — HOSPITAL ENCOUNTER (EMERGENCY)
Dept: HOSPITAL 14 - H.ER | Age: 53
Discharge: ANOTHER HOSPITAL | End: 2018-05-12
Payer: SELF-PAY

## 2018-05-12 VITALS — RESPIRATION RATE: 16 BRPM

## 2018-05-12 VITALS — HEART RATE: 73 BPM | DIASTOLIC BLOOD PRESSURE: 80 MMHG | TEMPERATURE: 98.4 F | SYSTOLIC BLOOD PRESSURE: 130 MMHG

## 2018-05-12 VITALS — BODY MASS INDEX: 20.9 KG/M2

## 2018-05-12 VITALS — OXYGEN SATURATION: 99 %
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
## 2018-05-12 DIAGNOSIS — S09.90XA: Primary | ICD-10-CM
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
## 2018-05-12 LAB
ALBUMIN SERPL-MCNC: 4.2 G/DL (ref 3.5–5)
ALBUMIN/GLOB SERPL: 1.2 {RATIO} (ref 1–2.1)
ALT SERPL-CCNC: 50 U/L (ref 21–72)
APTT BLD: 27 SECONDS (ref 25.6–37.1)
AST SERPL-CCNC: 53 U/L (ref 17–59)
BASOPHILS # BLD AUTO: 0.1 K/UL (ref 0–0.2)
BASOPHILS NFR BLD: 1.1 % (ref 0–2)
BUN SERPL-MCNC: 14 MG/DL (ref 9–20)
CALCIUM SERPL-MCNC: 9.7 MG/DL (ref 8.4–10.2)
EOSINOPHIL # BLD AUTO: 0.3 K/UL (ref 0–0.7)
EOSINOPHIL NFR BLD: 4.3 % (ref 0–4)
ERYTHROCYTE [DISTWIDTH] IN BLOOD BY AUTOMATED COUNT: 15.1 % (ref 11.5–14.5)
GFR NON-AFRICAN AMERICAN: > 60
HGB BLD-MCNC: 14.4 G/DL (ref 12–18)
INR PPP: 1 (ref 0.9–1.2)
LYMPHOCYTES # BLD AUTO: 1.8 K/UL (ref 1–4.3)
LYMPHOCYTES NFR BLD AUTO: 23 % (ref 20–40)
MCH RBC QN AUTO: 30.7 PG (ref 27–31)
MCHC RBC AUTO-ENTMCNC: 34.2 G/DL (ref 33–37)
MCV RBC AUTO: 89.7 FL (ref 80–94)
MONOCYTES # BLD: 0.7 K/UL (ref 0–0.8)
MONOCYTES NFR BLD: 9 % (ref 0–10)
NEUTROPHILS # BLD: 4.9 K/UL (ref 1.8–7)
NEUTROPHILS NFR BLD AUTO: 62.6 % (ref 50–75)
NRBC BLD AUTO-RTO: 0 % (ref 0–0)
PLATELET # BLD: 309 K/UL (ref 130–400)
PMV BLD AUTO: 7.6 FL (ref 7.2–11.7)
PROTHROMBIN TIME: 10.8 SECONDS (ref 9.8–13.1)
RBC # BLD AUTO: 4.69 MIL/UL (ref 4.4–5.9)
WBC # BLD AUTO: 7.9 K/UL (ref 4.8–10.8)

## 2018-05-12 PROCEDURE — 72125 CT NECK SPINE W/O DYE: CPT

## 2018-05-12 PROCEDURE — 70450 CT HEAD/BRAIN W/O DYE: CPT

## 2018-05-12 PROCEDURE — 80053 COMPREHEN METABOLIC PANEL: CPT

## 2018-05-12 PROCEDURE — 85730 THROMBOPLASTIN TIME PARTIAL: CPT

## 2018-05-12 PROCEDURE — 86900 BLOOD TYPING SEROLOGIC ABO: CPT

## 2018-05-12 PROCEDURE — 96374 THER/PROPH/DIAG INJ IV PUSH: CPT

## 2018-05-12 PROCEDURE — 85025 COMPLETE CBC W/AUTO DIFF WBC: CPT

## 2018-05-12 PROCEDURE — 99285 EMERGENCY DEPT VISIT HI MDM: CPT

## 2018-05-12 PROCEDURE — 85610 PROTHROMBIN TIME: CPT

## 2018-05-12 PROCEDURE — 86850 RBC ANTIBODY SCREEN: CPT

## 2018-05-12 PROCEDURE — 96375 TX/PRO/DX INJ NEW DRUG ADDON: CPT

## 2018-05-12 NOTE — ED PDOC
HPI: Trauma/Fall





- HPI


Time Seen by Provider: 05/12/18 17:41


Chief Complaint (Nursing): Trauma


History Per: Patient


History/Exam Limitations: no limitations


Injury Occurred (Timing): Days Ago: (4)


Additional Complaint(s): 





53 yo M reports sustaining  head trauma, today c/o headache and neck pain when 

he fell down the stairs 4 days ago. Otherwise:  (-) loss of consciousness, (-) 

nausea, (-) vomiting, (-) severe headache, (-) other injury, (-) back pain, (-) 

subjective neurologic deficit, (-) asa/anticoagulants.  Has no history of prior 

significant head injury.  





Past Medical History


Vital Signs: 





 Last Vital Signs











Temp  97.4 F L  05/12/18 17:39


 


Pulse  103 H  05/12/18 17:39


 


Resp  18   05/12/18 17:39


 


BP  115/79   05/12/18 17:39


 


Pulse Ox  99   05/12/18 17:39














- Medical History


PMH: Seizures


   Denies: HIV, Chronic Kidney Disease





- Family History


Family History: States: Unknown Family Hx





- Immunization History


Hx Tetanus Toxoid Vaccination: No


Hx Influenza Vaccination: No


Hx Pneumococcal Vaccination: No





- Home Medications


Home Medications: 


 Ambulatory Orders











 Medication  Instructions  Recorded


 


Folic Acid 1 mg PO DAILY #30 tab 02/19/18


 


Meclizine [Meclizine*] 25 mg PO Q12 #60 tab 02/19/18


 


Thiamine [Vitamin B1 Tab] 100 mg PO DAILY #30 tab 02/19/18


 


levETIRAcetam [Keppra] 500 mg PO BID #60 tab 02/19/18














- Allergies


Allergies/Adverse Reactions: 


 Allergies











Allergy/AdvReac Type Severity Reaction Status Date / Time


 


No Known Allergies Allergy   Verified 05/07/17 20:26














Review of Systems


Constitutional: Negative for: Fever, Weakness, Malaise


Cardiovascular: Negative for: Chest Pain, Palpitations, Edema


Respiratory: Negative for: Cough, Shortness of Breath, Wheezing


Gastrointestinal: Negative for: Nausea, Vomiting, Abdominal Pain


Musculoskeletal: Positive for: Neck Pain.  Negative for: Shoulder Pain, Back 

Pain


Skin: Negative for: Rash, Lesions


Neurological: Negative for: Weakness, Numbness, Incoordination





Physical Exam





- Physical Exam


Comments: 





GENERALIZED APPEARANCE: Patient is AAO x 3 in mild painful distress. 


SKIN: Warm, dry; (-) cyanosis.


HEAD: (-) swelling and tenderness, with no palpable bony defect. 


EYES: (-) conjunctival pallor, (-) scleral icterus, (-) nystagmus.


ENMT: Mucous membranes moist. (-) Mathew's sign. TMs: (-) blood. Nose: (-) 

tenderness, (-) rhinorrhea. No oral trauma. Pharynx clear. Airway patent: (-) 

stridor. 


NECK: (+) mild midline tenderness, (-) stiffness, (-) lymphadenopathy.


CHEST AND RESPIRATORY: (-) chest wall tenderness. Lungs: (-) rales, (-) rhonchi

, (-) wheezes; breath sounds equal bilaterally.


HEART AND CARDIOVASCULAR: (-) irregularity; (-) murmur, (-) gallop.


ABDOMEN AND GI: Soft; (-) tenderness.


BACK: (-) tenderness.


EXTREMITIES: (-) deformity, (-) tenderness, (-) limitation of motion.


NEURO AND PSYCH: GCS=15. Mental status as above. Has full memory of episode; CNs

: Pupils equal and reactive. EOMI. (-) facial asymmetry. Strength 5/5. No gross 

sensory deficits. 





- ECG


O2 Sat by Pulse Oximetry: 99





Medical Decision Making


Medical Decision Making: 





Plan : 


- CT head


- CT C spine


- Tylenol PO








Patient at CT. PA called by CT tech, notified of appearance of multi level C 

spine fracture. PA immediately placed hard C collar on the patient's neck to 

stabilize him while he is in CT. CT images sent stat to [facility]. Patient escorted 

back to the ER with PA. Repeat neuro exam is completely normal. Patient remains 

AAOx3, CNs grossly intact, sensory intact and motor 5/5 to the b/l UE and LE 

are equal. ER MD made aware, case discussed with Dr. Hernandez in great detail. 

Neurosurgery consulted stat. Case d/w Dr. Puentes. He reviewed the CT of 

the C spine. As per Dr. Puentes, the patient will need a higher level of 

care not available at this facility, specific equipment necessary to stabilize 

his C spine fracture is not available at this hospital. He recommends transfer 

to [facility] in [place]. Patient made aware that he will need to be 

transferred to another facility for further care, likely [facility] in 

[place]. Risk and benefits to transfer was explained to the patient, risk - 

traffic delay, delay of transfer, worsening of current condition, benefits - 

access to a higher level of neurosurgical care, which the patient verbalize 

understanding and agreement with transfer. CT reading by [facility] still pending. IV

, labs ordered. Patient medicated with morphine 4 mg and zofran 4 mg IV.





CT head w/o contrast : FINDINGS:


Brain: Unremarkable. No hemorrhage. No significant white matter disease. No 

edema.


Ventricles: Unremarkable. No ventriculomegaly.


Bones/joints: Unremarkable. No acute fracture.


Soft tissues: Right-sided scalp swelling.


Sinuses: Unremarkable as visualized. No acute sinusitis.


Mastoid air cells: Unremarkable as visualized. No mastoid effusion.


IMPRESSION:


Superficial scalp injury without intra-cranial hemorrhage or acute fracture.





Dictated and Authenticated by: Gabriela Coreas MD


05/12/2018 7:55 PM Eastern Time ([place] & [place])





CT C spine w/o contrast : FINDINGS:


Vertebrae: There are acute fractures of both laminae and spinous process at C2, 

with 1 cm of


anterolisthesis of C2-3.


Discs/spinal canal/neural foramina: Multilevel moderate degenerative disc 

disease. Cord deformity


with probable mild compression at the C2-3 level.


Soft tissues: Unremarkable.


Lung apices: Unremarkable as visualized.


Other findings: Multilevel


IMPRESSION:


Bilateral acute laminar fractures at C2 also involving the spinous process, 

with 1 cm of anterolisthesis


at C2-3.


Cord deformity with probable mild compression at the C2-3 level. Cord injury 

not excluded. MRI may


be considered for further evaluation as clinically indicated.





Dictated and Authenticated by: Gabriela Coreas MD


05/12/2018 8:03 PM Eastern Time ([place] & [place])





[facility] called to initiate transfer [phone], 

awaiting call back from Dr. Price, trauma specialist.


Case d/w Dr. Price, she recommends transfer to neurosurgery as the patient has 

an isolated finding to his C spine due to an injury that is 4 days old. She 

will not accept the transfer. 


Neurosurgery at [facility] called, they are not on call for spine. Ortho Dr. Bloom (on call for spine at [facility]) called [phone]. 


Case d/w Dr. Bloom, agrees to accept the patient for transfer, she requests 

transfer ER to ER. 


ER MD Dr. Ogden at [facility] called and notified of transfer, he will 

anticipate for the patient's arrival.  


Charge RN and ER RN of the patient made aware of transfer. Arrangements made 

for transport via BLS. 


 





Disposition





- Clinical Impression


Clinical Impression: 


 Fall, Head injury, Cervical spine fracture





Counseled Patient/Family Regarding: Studies Performed, Diagnosis





- Disposition


Disposition: Other Institution (Patient ot be transferred to [facility] ER, admitting MD Dr. Bloom (ortho on call for spine))


Disposition Time: 20:00


Condition: STABLE


Forms:  [facility] (English)

## 2018-05-13 NOTE — CT
PROCEDURE:  CT HEAD WITHOUT CONTRAST.



HISTORY:

trauma, fall 4 days ago



COMPARISON:

None available. 



TECHNIQUE:

Axial computed tomography images were obtained through the head/brain 

without intravenous contrast.  



Radiation dose:



Total exam DLP =  mGy-cm.



This CT exam was performed using one or more of the following dose 

reduction techniques: Automated exposure control, adjustment of the 

mA and/or kV according to patient size, and/or use of iterative 

reconstruction technique.



FINDINGS:



HEMORRHAGE:

No intracranial hemorrhage. 



BRAIN:

No mass effect or edema.  No atrophy or chronic microvascular 

ischemic changes.



VENTRICLES:

Unremarkable. No hydrocephalus. 



CALVARIUM:

Unremarkable.



PARANASAL SINUSES:

Unremarkable as visualized. No significant inflammatory changes.



MASTOID AIR CELLS:

Unremarkable as visualized. No inflammatory changes.



OTHER FINDINGS:

None.



IMPRESSION:

Normal CT of the Head.

## 2018-05-13 NOTE — CT
PROCEDURE:  CT Cervical Spine without contrast



HISTORY:

trauma, fall



COMPARISON:

None available.



TECHNIQUE:

Axial computed tomography images were obtained of the cervical spine 

without the use of intravenous contrast. Coronal and sagittal 

reformatted images were created and reviewed.



Radiation dose: 



Total exam DLP =  mGy-cm.



This CT exam was performed using one or more of the following dose 

reduction techniques: Automated exposure control, adjustment of the 

mA and/or kV according to patient size, and/or use of iterative 

reconstruction technique.



FINDINGS:



VERTEBRAE:

Acute fracture of the laminae and spinous prostheses of C2 with 1 

centimeter anterolisthesis at C2-3.



DISCS/SPINAL CANAL/NEURAL FORAMINA:

Multilevel degenerative disc disease with likely cord compression at 

C2-3. Discs heights are grossly preserved.



PARASPINAL SOFT TISSUES:

Unremarkable. 



OTHER FINDINGS:

None.



IMPRESSION:

Acute fracture of the laminae and spinous prostheses of C2 with 1 

centimeter anterolisthesis at C2-3.Multilevel degenerative disc 

disease with likely cord compression at C2-3.